# Patient Record
Sex: MALE | Race: WHITE | NOT HISPANIC OR LATINO | Employment: OTHER | URBAN - METROPOLITAN AREA
[De-identification: names, ages, dates, MRNs, and addresses within clinical notes are randomized per-mention and may not be internally consistent; named-entity substitution may affect disease eponyms.]

---

## 2017-10-03 ENCOUNTER — OFFICE VISIT (OUTPATIENT)
Dept: FAMILY MEDICINE | Facility: CLINIC | Age: 37
End: 2017-10-03

## 2017-10-03 VITALS
HEIGHT: 76 IN | OXYGEN SATURATION: 96 % | TEMPERATURE: 98.6 F | HEART RATE: 113 BPM | BODY MASS INDEX: 38.36 KG/M2 | WEIGHT: 315 LBS | SYSTOLIC BLOOD PRESSURE: 184 MMHG | DIASTOLIC BLOOD PRESSURE: 116 MMHG

## 2017-10-03 DIAGNOSIS — K04.7 DENTAL ABSCESS: Primary | ICD-10-CM

## 2017-10-03 DIAGNOSIS — E78.5 HYPERLIPIDEMIA WITH TARGET LDL LESS THAN 70: ICD-10-CM

## 2017-10-03 DIAGNOSIS — E66.01 MORBID OBESITY (H): ICD-10-CM

## 2017-10-03 DIAGNOSIS — I10 HYPERTENSION GOAL BP (BLOOD PRESSURE) < 140/90: ICD-10-CM

## 2017-10-03 DIAGNOSIS — G47.33 OSA (OBSTRUCTIVE SLEEP APNEA): ICD-10-CM

## 2017-10-03 LAB
ERYTHROCYTE [DISTWIDTH] IN BLOOD BY AUTOMATED COUNT: 12.1 % (ref 10–15)
HBA1C MFR BLD: 11.5 % (ref 4.3–6)
HCT VFR BLD AUTO: 44.7 % (ref 40–53)
HGB BLD-MCNC: 16.2 G/DL (ref 13.3–17.7)
MCH RBC QN AUTO: 31 PG (ref 26.5–33)
MCHC RBC AUTO-ENTMCNC: 36.2 G/DL (ref 31.5–36.5)
MCV RBC AUTO: 86 FL (ref 78–100)
PLATELET # BLD AUTO: 220 10E9/L (ref 150–450)
RBC # BLD AUTO: 5.23 10E12/L (ref 4.4–5.9)
WBC # BLD AUTO: 6.2 10E9/L (ref 4–11)

## 2017-10-03 PROCEDURE — 82043 UR ALBUMIN QUANTITATIVE: CPT | Performed by: PHYSICIAN ASSISTANT

## 2017-10-03 PROCEDURE — 36415 COLL VENOUS BLD VENIPUNCTURE: CPT | Performed by: PHYSICIAN ASSISTANT

## 2017-10-03 PROCEDURE — 99214 OFFICE O/P EST MOD 30 MIN: CPT | Performed by: PHYSICIAN ASSISTANT

## 2017-10-03 PROCEDURE — 80053 COMPREHEN METABOLIC PANEL: CPT | Performed by: PHYSICIAN ASSISTANT

## 2017-10-03 PROCEDURE — 84443 ASSAY THYROID STIM HORMONE: CPT | Performed by: PHYSICIAN ASSISTANT

## 2017-10-03 PROCEDURE — 83721 ASSAY OF BLOOD LIPOPROTEIN: CPT | Performed by: PHYSICIAN ASSISTANT

## 2017-10-03 PROCEDURE — 83036 HEMOGLOBIN GLYCOSYLATED A1C: CPT | Performed by: PHYSICIAN ASSISTANT

## 2017-10-03 PROCEDURE — 85027 COMPLETE CBC AUTOMATED: CPT | Performed by: PHYSICIAN ASSISTANT

## 2017-10-03 RX ORDER — LISINOPRIL 20 MG/1
20 TABLET ORAL DAILY
Qty: 90 TABLET | Refills: 1 | Status: SHIPPED | OUTPATIENT
Start: 2017-10-03 | End: 2017-10-26

## 2017-10-03 RX ORDER — CLINDAMYCIN HCL 150 MG
150 CAPSULE ORAL 4 TIMES DAILY
Qty: 40 CAPSULE | Refills: 0 | COMMUNITY
Start: 2017-10-03 | End: 2017-10-26

## 2017-10-03 NOTE — NURSING NOTE
"Chief Complaint   Patient presents with     Hypertension     210/160 bp yesterday.        Initial BP (!) 184/116  Pulse 113  Temp 98.6  F (37  C) (Tympanic)  Ht 6' 4\" (1.93 m)  Wt (!) 340 lb (154.2 kg)  SpO2 96%  BMI 41.39 kg/m2 Estimated body mass index is 41.39 kg/(m^2) as calculated from the following:    Height as of this encounter: 6' 4\" (1.93 m).    Weight as of this encounter: 340 lb (154.2 kg).  Medication Reconciliation: complete   Theresa Jackson, JAMILA      "

## 2017-10-03 NOTE — PATIENT INSTRUCTIONS
You may be eligible for the GRADE study at the [facility]  (Glycemia reduction approaches in Diabetes: A comparative effectiveness study).    The point of the study is to determine what is the best second drug to add to metformin in patients with type 2 diabetes.  Adullts with type 2 for less than 10 years who take metformin or no medication at all.  Study participants would come to the [place] 4 times a year for seven years and receive FREE medications, lab tests, diabetes supplies.    Call , email [email or web address], or visit BRYNN on faceboook at [email or web address]    [facility] Hypertension Study Summary     Thank you for your interest in the [facility] hypertension research study. If you would like to enroll or know more about using your genetics to determine which hypertensive medications may work best for you please contact the research coordinator as [phone] or email [email or web address]    If enrolled you would be asked to attend 5 to 8 study visits over the next 12 months.    The  may reach out to you to ask some questions about your medical history and availability for future visits.

## 2017-10-03 NOTE — MR AVS SNAPSHOT
After Visit Summary   10/3/2017    John Dailey    MRN: [id number]           Patient Information     Date Of Birth          1980        Visit Information        Provider Department      10/3/2017 1:20 PM Mee Geller PA-C [facility] [place]        Today's Diagnoses     Dental abscess    -  1    Uncontrolled type 2 diabetes mellitus with complication, without long-term current use of insulin (H)        Morbid obesity (H)        Hypertension goal BP (blood pressure) < 140/90        Hyperlipidemia with target LDL less than 70        YULIA (obstructive sleep apnea)          Care Instructions    You may be eligible for the GRADE study at the [facility]  (Glycemia reduction approaches in Diabetes: A comparative effectiveness study).    The point of the study is to determine what is the best second drug to add to metformin in patients with type 2 diabetes.  Adullts with type 2 for less than 10 years who take metformin or no medication at all.  Study participants would come to the [place] 4 times a year for seven years and receive FREE medications, lab tests, diabetes supplies.    Call , email [email or web address], or visit SIOBHAN on faceboook at [email or web address]    [facility] Hypertension Study Summary     Thank you for your interest in the [facility] hypertension research study. If you would like to enroll or know more about using your genetics to determine which hypertensive medications may work best for you please contact the research coordinator as [phone] or email [email or web address]    If enrolled you would be asked to attend 5 to 8 study visits over the next 12 months.    The  may reach out to you to ask some questions about your medical history and availability for future visits.               Follow-ups after your visit        Who to contact     If you have questions or need follow up information about today's clinic visit  "or your schedule please contact [facility] directly at [phone].  Normal or non-critical lab and imaging results will be communicated to you by MyChart, letter or phone within 4 business days after the clinic has received the results. If you do not hear from us within 7 days, please contact the clinic through [employer] or phone. If you have a critical or abnormal lab result, we will notify you by phone as soon as possible.  Submit refill requests through [employer] or call your pharmacy and they will forward the refill request to us. Please allow 3 business days for your refill to be completed.          Additional Information About Your Visit        UluleharCouple Information     [employer] lets you send messages to your doctor, view your test results, renew your prescriptions, schedule appointments and more. To sign up, go to [email or web address] . Click on \"Log in\" on the left side of the screen, which will take you to the Welcome page. Then click on \"Sign up Now\" on the right side of the page.     You will be asked to enter the access code listed below, as well as some personal information. Please follow the directions to create your username and password.     Your access code is: [email or web address]  Expires: 2018  1:58 PM     Your access code will  in 90 days. If you need help or a new code, please call your [place] clinic or [phone].        Care EveryWhere ID     This is your Care EveryWhere ID. This could be used by other organizations to access your [place] medical records  [id number]        Your Vitals Were     Pulse Temperature Height Pulse Oximetry BMI (Body Mass Index)       113 98.6  F (37  C) (Tympanic) 6' 4\" (1.93 m) 96% 41.39 kg/m2        Blood Pressure from Last 3 Encounters:   10/03/17 (!) 184/116   16 (!) 142/100   05/14/15 (!) 151/97    Weight from Last 3 Encounters:   10/03/17 (!) 340 lb (154.2 kg)   16 (!) 342 lb (155.1 kg)   05/14/15 (!) 389 lb (176.4 kg)    "           We Performed the Following     Albumin Random Urine Quantitative with Creat Ratio     CBC with platelets     Comprehensive metabolic panel     Hemoglobin A1c     LDL cholesterol direct     TSH with free T4 reflex          Today's Medication Changes          These changes are accurate as of: 10/3/17  1:58 PM.  If you have any questions, ask your nurse or doctor.               Start taking these medicines.        Dose/Directions    lisinopril 20 MG tablet   Commonly known as:  PRINIVIL/ZESTRIL   Used for:  Hypertension goal BP (blood pressure) < 140/90   Started by:  Mee Geller PA-C        Dose:  20 mg   Take 1 tablet (20 mg) by mouth daily   Quantity:  90 tablet   Refills:  1       metFORMIN 500 MG tablet   Commonly known as:  GLUCOPHAGE   Used for:  Uncontrolled type 2 diabetes mellitus with complication, without long-term current use of insulin (H)   Started by:  Mee Geller PA-C        Dose:  500 mg   Take 1 tablet (500 mg) by mouth daily (with dinner) X 3 days.  Then increase to 1 tablet BID with meals x 3 days, then 2 tabs with breakfast & 1 tab with dinner x 3 days, then 2 tabs BID with meals thereafter.   Quantity:  180 tablet   Refills:  1            Where to get your medicines      These medications were sent to [facility] [address] [place] [address] AT [facility] [address] [address]    Hours:  24-hours Phone:  [phone]     lisinopril 20 MG tablet         Some of these will need a paper prescription and others can be bought over the counter.  Ask your nurse if you have questions.     Bring a paper prescription for each of these medications     metFORMIN 500 MG tablet                Primary Care Provider Office Phone # Fax #    Babar Oliveira [phone]       [address] [place] [id number]        Equal Access to Services     KERRI ESTRADA AH: keven Olivera  erik adenlyric hummelevelia hareshandrea aguilar. So [facility] [phone].    ATENCIÓN: Si margie lau, tiene a wilkins disposición servicios gratuitos de asistencia lingüística. Karissa al [phone].    We comply with applicable federal civil rights laws and [place] laws. We do not discriminate on the basis of race, color, national origin, age, disability, sex, sexual orientation, or gender identity.            Thank you!     Thank you for choosing [facility]  for your care. Our goal is always to provide you with excellent care. Hearing back from our patients is one way we can continue to improve our services. Please take a few minutes to complete the written survey that you may receive in the mail after your visit with us. Thank you!             Your Updated Medication List - Protect others around you: Learn how to safely use, store and throw away your medicines at www.disposemymeds.org.          This list is accurate as of: 10/3/17  1:58 PM.  Always use your most recent med list.                   Brand Name Dispense Instructions for use Diagnosis    AUGMENTIN PO           lisinopril 20 MG tablet    PRINIVIL/ZESTRIL    90 tablet    Take 1 tablet (20 mg) by mouth daily    Hypertension goal BP (blood pressure) < 140/90       metFORMIN 500 MG tablet    GLUCOPHAGE    180 tablet    Take 1 tablet (500 mg) by mouth daily (with dinner) X 3 days.  Then increase to 1 tablet BID with meals x 3 days, then 2 tabs with breakfast & 1 tab with dinner x 3 days, then 2 tabs BID with meals thereafter.    Uncontrolled type 2 diabetes mellitus with complication, without long-term current use of insulin (H)

## 2017-10-03 NOTE — PROGRESS NOTES
SUBJECTIVE:   John Dailey is a 37 year old male who presents to clinic today for the following health issues:    Elevated blood pressure  Blood pressure has been chronically elevated over the last few years. Yesterday his blood pressure was 210/160 at the [facility]. He is unable to have dental procedure done until his blood pressure is controlled. Developed tooth pain last week with associated ear pain. Currently on Clindamycin. Takes ibuprofen and tylenol for pain relief - 800 mg ibuprofen 3-4 times per day, and 800 mg tylenol 3-4 times per day. He reports dizziness with severe tooth pain. Denies fever.     BP Readings from Last 6 Encounters:   10/03/17 (!) 184/116   03/25/16 (!) 142/100   05/14/15 (!) 151/97   04/23/15 (!) 150/100   04/03/15 (!) 138/102   03/25/15 (!) 140/106     Diabetes  Diagnosed with diabetes in 2015. Previously was prescribed Metformin by Dr Oliveira. Denies side effects with Metformin. Patient relates he has been losing weight over the last two years. However, he notes he has gained 40 pounds since the last time he weighed himself. He was surprised by this when he stepped on the scale today as he was down to 300 pounds. He eats small frequent meals. Has cut down on juices and sodas. Denies numbness or tingling in extremities. No vision changes.    Lab Results   Component Value Date    A1C 11.5 10/03/2017    A1C 11.5 03/18/2015     Wt Readings from Last 3 Encounters:   10/03/17 (!) 340 lb (154.2 kg)   03/25/16 (!) 342 lb (155.1 kg)   05/14/15 (!) 389 lb (176.4 kg)       Social history:   FMHx: Hypertension in father, Diabetes in mother     Problem list and histories reviewed & adjusted, as indicated.  Additional history: as documented    Patient Active Problem List   Diagnosis     TBI (traumatic brain injury) (H)     Hypertension goal BP (blood pressure) < 140/90     Nephrolithiasis     YULIA (obstructive sleep apnea)     Hyperlipidemia with target LDL less than 70      Morbid obesity (H)     Type 2 diabetes mellitus without complication (H)     Uncontrolled type 2 diabetes mellitus with complication, without long-term current use of insulin (H)     Past Surgical History:   Procedure Laterality Date     DENTAL SURGERY  1997    wisdom teeth     SURGICAL HISTORY OF -   2002, 2003    kidney stone removal       Social History   Substance Use Topics     Smoking status: Never Smoker     Smokeless tobacco: Never Used     Alcohol use 0.0 - 0.5 oz/week     0 - 1 Standard drinks or equivalent per week      Comment: 1 or 2 drinks yearly     Family History   Problem Relation Age of Onset     DIABETES Mother      prediabetes     Hypertension Father      C.A.D. Father      MI age 58     CEREBROVASCULAR DISEASE Father      DIABETES Brother 35     Insulin dependent     Glaucoma Maternal Grandmother      C.A.D. Maternal Grandfather      Respiratory Brother      YULIA         Current Outpatient Prescriptions   Medication Sig Dispense Refill     Amoxicillin-Pot Clavulanate (AUGMENTIN PO)        metFORMIN (GLUCOPHAGE) 500 MG tablet Take 1 tablet (500 mg) by mouth daily (with dinner) X 3 days.  Then increase to 1 tablet BID with meals x 3 days, then 2 tabs with breakfast & 1 tab with dinner x 3 days, then 2 tabs BID with meals thereafter. 180 tablet 1     lisinopril (PRINIVIL/ZESTRIL) 20 MG tablet Take 1 tablet (20 mg) by mouth daily 90 tablet 1     clindamycin (CLEOCIN) 150 MG capsule Take 1 capsule (150 mg) by mouth 4 times daily 40 capsule 0     Allergies   Allergen Reactions     Augmentin [Amoxicillin-Pot Clavulanate] Anaphylaxis     ?     Alcohol Other (See Comments)     Patient is allergic to Beer, His tongue swells     Beer      Latex          Reviewed and updated as needed this visit by clinical staff  Tobacco  Allergies  Meds  Problems  Med Hx  Surg Hx  Fam Hx  Soc Hx        Reviewed and updated as needed this visit by Provider  Tobacco  Allergies  Meds  Problems  Med Hx  Surg Hx   "Fam Hx  Soc Hx          ROS:  Constitutional, HEENT, cardiovascular, pulmonary, GI, , musculoskeletal, neuro, skin, endocrine and psych systems are negative, except as otherwise noted.    This document serves as a record of the services and decisions personally performed and made by Mee Geller PA-C. It was created on her behalf by Kady Brown, a trained medical scribe. The creation of this document is based on the provider's statements to the medical scribe.  Kady Brown 1:44 PM October 3, 2017    OBJECTIVE:   BP (!) 184/116  Pulse 113  Temp 98.6  F (37  C) (Tympanic)  Ht 6' 4\" (1.93 m)  Wt (!) 340 lb (154.2 kg)  SpO2 96%  BMI 41.39 kg/m2  Body mass index is 41.39 kg/(m^2).  GENERAL: healthy, alert and no distress  NECK: no adenopathy, no asymmetry, masses, or scars and thyroid normal to palpation  RESP: lungs clear to auscultation - no rales, rhonchi or wheezes  CV: regular rate and rhythm, normal S1 S2, no S3 or S4, no murmur, click or rub, no peripheral edema and peripheral pulses strong  PSYCH: mentation appears normal, affect normal/bright    Diagnostic Test Results:  Results for orders placed or performed in visit on 10/03/17 (from the past 24 hour(s))   Hemoglobin A1c   Result Value Ref Range    Hemoglobin A1C 11.5 (H) 4.3 - 6.0 %   CBC with platelets   Result Value Ref Range    WBC 6.2 4.0 - 11.0 10e9/L    RBC Count 5.23 4.4 - 5.9 10e12/L    Hemoglobin 16.2 13.3 - 17.7 g/dL    Hematocrit 44.7 40.0 - 53.0 %    MCV 86 78 - 100 fl    MCH 31.0 26.5 - 33.0 pg    MCHC 36.2 31.5 - 36.5 g/dL    RDW 12.1 10.0 - 15.0 %    Platelet Count 220 150 - 450 10e9/L       ASSESSMENT/PLAN:   John was seen today for hypertension.    Diagnoses and all orders for this visit:    Dental abscess  He was scheduled to undergo dental procedure yesterday. However, it was not due to uncontrolled hypertension. He is currently taking Clindamycin.     Uncontrolled type 2 diabetes mellitus with complication, " without long-term current use of insulin (H), Morbid obesity (H)  A1c is 11.5 today. Discussed with patient the risks of uncontrolled diabetes. He previously was started on Metformin by Dr Oliveira. He relates he has been working on weight loss with eating small frequent meals. Body mass index is 41.39 kg/(m^2). Starting Metformin. Medication direction, dosage, and side effects discussed with patient. Provided patient with information on GRADE study.   -     Hemoglobin A1c  -     TSH with free T4 reflex  -     CBC with platelets  -     LDL cholesterol direct  -     metFORMIN (GLUCOPHAGE) 500 MG tablet; Take 1 tablet (500 mg) by mouth daily (with dinner) X 3 days.  Then increase to 1 tablet BID with meals x 3 days, then 2 tabs with breakfast & 1 tab with dinner x 3 days, then 2 tabs BID with meals thereafter.      Hypertension goal BP (blood pressure) < 140/90  Blood pressure is uncontrolled. It has been chronically elevated over the last several years. Yesterday he was unable to undergo dental procedure because his blood pressure was 210/160. Starting lisinopril. Medication direction, dosage, and side effects discussed with patient. Checking labs. Discussed PGen study with patient, however, he will not qualify as his BMI is too high.  Follow up in 3-5 days for blood pressure check.   -     Albumin Random Urine Quantitative with Creat Ratio  -     Comprehensive metabolic panel  -     lisinopril (PRINIVIL/ZESTRIL) 20 MG tablet; Take 1 tablet (20 mg) by mouth daily    Hyperlipidemia with target LDL less than 70  Rechecking labs today.    YULIA (obstructive sleep apnea)  - Continue CPAP    Patient Instructions   You may be eligible for the GRADE study at the [facility]  (Glycemia reduction approaches in Diabetes: A comparative effectiveness study).    The point of the study is to determine what is the best second drug to add to metformin in patients with type 2 diabetes.  Adullts with type 2 for less than 10  years who take metformin or no medication at all.  Study participants would come to the [place] 4 times a year for seven years and receive FREE medications, lab tests, diabetes supplies.    Call , email [email or web address], or visit BRYNN on faceboook at [email or web address]    [facility] Hypertension Study Summary     Thank you for your interest in the [facility] hypertension research study. If you would like to enroll or know more about using your genetics to determine which hypertensive medications may work best for you please contact the research coordinator as [phone] or email [email or web address]    If enrolled you would be asked to attend 5 to 8 study visits over the next 12 months.    The  may reach out to you to ask some questions about your medical history and availability for future visits.           The information in this document, created by the medical scribe for me, accurately reflects the services I personally performed and the decisions made by me. I have reviewed and approved this document for accuracy prior to leaving the patient care area.  October 3, 2017 1:44 PM      Mee Geller PA-C  [facility] [place]

## 2017-10-04 LAB
ALBUMIN SERPL-MCNC: 4.2 G/DL (ref 3.4–5)
ALP SERPL-CCNC: 101 U/L (ref 40–150)
ALT SERPL W P-5'-P-CCNC: 32 U/L (ref 0–70)
ANION GAP SERPL CALCULATED.3IONS-SCNC: 8 MMOL/L (ref 3–14)
AST SERPL W P-5'-P-CCNC: 18 U/L (ref 0–45)
BILIRUB SERPL-MCNC: 0.8 MG/DL (ref 0.2–1.3)
BUN SERPL-MCNC: 15 MG/DL (ref 7–30)
CALCIUM SERPL-MCNC: 9.3 MG/DL (ref 8.5–10.1)
CHLORIDE SERPL-SCNC: 101 MMOL/L (ref 94–109)
CO2 SERPL-SCNC: 28 MMOL/L (ref 20–32)
CREAT SERPL-MCNC: 0.73 MG/DL (ref 0.66–1.25)
CREAT UR-MCNC: 48 MG/DL
GFR SERPL CREATININE-BSD FRML MDRD: >90 ML/MIN/1.7M2
GLUCOSE SERPL-MCNC: 281 MG/DL (ref 70–99)
LDLC SERPL DIRECT ASSAY-MCNC: 84 MG/DL
MICROALBUMIN UR-MCNC: 63 MG/L
MICROALBUMIN/CREAT UR: 129.07 MG/G CR (ref 0–17)
POTASSIUM SERPL-SCNC: 4 MMOL/L (ref 3.4–5.3)
PROT SERPL-MCNC: 7.6 G/DL (ref 6.8–8.8)
SODIUM SERPL-SCNC: 137 MMOL/L (ref 133–144)
TSH SERPL DL<=0.005 MIU/L-ACNC: 0.6 MU/L (ref 0.4–4)

## 2017-10-04 NOTE — PROGRESS NOTES
John  I have reviewed your recent labs. Here are the results:    -Liver and gallbladder tests are normal. (ALT,AST, Alk phos, bilirubin), kidney function is normal (Cr, GFR), Sodium is normal, Potassium is normal, Calcium is normal.  -Cholesterol levels (LDL,HDL, Triglycerides) are normal.  ADVISE: rechecking in 1 year.  -TSH (thyroid stimulating hormone) level is normal which indicates normal thyroid function.  -Normal red blood cell (hgb) levels, normal white blood cell count and normal platelet levels.  -A1C test (average blood sugar the last 2-3 months) is above your goal.   ADVISE:  Start metformin as we discussed.  Diabetic followup appointment in 4 weeks or follow up with the GRADE study as we discussed.  -Microalbumin is present in your urine, this is due to your blood pressure and your uncontrolled diabetes and indicative of early damage to your kidneys.     If you have any questions please do not hesitate to contact our office via phone ([phone]) or MyChart.    Mee Geller, MS, PA-C  [facility]

## 2017-10-05 ENCOUNTER — ALLIED HEALTH/NURSE VISIT (OUTPATIENT)
Dept: FAMILY MEDICINE | Facility: CLINIC | Age: 37
End: 2017-10-05

## 2017-10-05 VITALS — SYSTOLIC BLOOD PRESSURE: 158 MMHG | DIASTOLIC BLOOD PRESSURE: 100 MMHG

## 2017-10-05 DIAGNOSIS — I10 HYPERTENSION GOAL BP (BLOOD PRESSURE) < 140/90: Primary | ICD-10-CM

## 2017-10-05 PROCEDURE — 99207 ZZC NO CHARGE NURSE ONLY: CPT | Performed by: FAMILY MEDICINE

## 2017-10-05 NOTE — PROGRESS NOTES
John Dailye is enrolled/participating in the retail pharmacy Blood Pressure Goals Achievement Program (BPGAP).  John Dailey was evaluated at [facility] on October 5, 2017 at which time his blood pressure was:    BP Readings from Last 3 Encounters:   10/05/17 (!) 160/102   10/03/17 (!) 184/116   03/25/16 (!) 142/100     Reviewed lifestyle modifications for blood pressure control and reduction: including making healthy food choices, managing weight, getting regular exercise, smoking cessation, reducing alcohol consumption, monitoring blood pressure regularly.     John Dailey is not experiencing symptoms.    Follow-Up: BP is not at goal of < 140/90mmHg (patient 18+ years of age with or without diabetes), Recommended follow-up with PCP.  Routing to PCP for further review.    Recommendation to Provider: Patient qualified for PGEN study.  Provided information re study.      John Dailey was evaluated for enrollment into the PGEN study today.    Patient eligible for enrollment:  Yes  Patient interested in enrollment:  Yes    Completed by: Thank you,  Jayla Molina [facility], Mgr [facility] [place] [phone]

## 2017-10-05 NOTE — MR AVS SNAPSHOT
After Visit Summary   10/5/2017    John Dailey    MRN: [id number]           Patient Information     Date Of Birth          1980        Visit Information        Provider Department      10/5/2017 12:25 PM Babar Oliveira MD [facility]        Today's Diagnoses     Hypertension goal BP (blood pressure) < 140/90    -  1       Follow-ups after your visit        Who to contact     If you have questions or need follow up information about today's clinic visit or your schedule please contact [facility] directly at [phone].  Normal or non-critical lab and imaging results will be communicated to you by Wanderflyhart, letter or phone within 4 business days after the clinic has received the results. If you do not hear from us within 7 days, please contact the clinic through Galvanize Venturest or phone. If you have a critical or abnormal lab result, we will notify you by phone as soon as possible.  Submit refill requests through [employer]X Control Solutions or call your pharmacy and they will forward the refill request to us. Please allow 3 business days for your refill to be completed.          Additional Information About Your Visit        MyChart Information     [employer] gives you secure access to your electronic health record. If you see a primary care provider, you can also send messages to your care team and make appointments. If you have questions, please call your primary care clinic.  If you do not have a primary care provider, please call [phone] and they will assist you.        Care EveryWhere ID     This is your Care EveryWhere ID. This could be used by other organizations to access your [facility] medical records  [id number]         Blood Pressure from Last 3 Encounters:   10/05/17 (!) 158/100   10/03/17 (!) 184/116   03/25/16 (!) 142/100    Weight from Last 3 Encounters:   10/03/17 (!) 340 lb (154.2 kg)   03/25/16 (!) 342 lb (155.1 kg)   05/14/15 (!) 389 lb (176.4 kg)              Today,  you had the following     No orders found for display       Primary Care Provider Office Phone # Fax #    Babar Oliveira [phone]       [address] [place] [address]        Equal Access to Services     KERRI ESTRADA : Hadii yessica ku neilo Soluis, waaxda luqadaha, qaybta kaalmada adeegyada, andrea basurton atif carter se cruz. So [facility] [phone].    ATENCIÓN: Si habla español, tiene a wilkins disposición servicios gratuitos de asistencia lingüística. Llame al [phone].    We comply with applicable federal civil rights laws and [place] laws. We do not discriminate on the basis of race, color, national origin, age, disability, sex, sexual orientation, or gender identity.            Thank you!     Thank you for choosing [facility]  for your care. Our goal is always to provide you with excellent care. Hearing back from our patients is one way we can continue to improve our services. Please take a few minutes to complete the written survey that you may receive in the mail after your visit with us. Thank you!             Your Updated Medication List - Protect others around you: Learn how to safely use, store and throw away your medicines at www.disposemymeds.org.          This list is accurate as of: 10/5/17 11:59 PM.  Always use your most recent med list.                   Brand Name Dispense Instructions for use Diagnosis    clindamycin 150 MG capsule    CLEOCIN    40 capsule    Take 1 capsule (150 mg) by mouth 4 times daily        lisinopril 20 MG tablet    PRINIVIL/ZESTRIL    90 tablet    Take 1 tablet (20 mg) by mouth daily    Hypertension goal BP (blood pressure) < 140/90       metFORMIN 500 MG tablet    GLUCOPHAGE    180 tablet    Take 1 tablet (500 mg) by mouth daily (with dinner) X 3 days.  Then increase to 1 tablet BID with meals x 3 days, then 2 tabs with breakfast & 1 tab with dinner x 3 days, then 2 tabs BID with meals thereafter.    Uncontrolled type 2  diabetes mellitus with complication, without long-term current use of insulin (H)

## 2017-10-09 ENCOUNTER — TELEPHONE (OUTPATIENT)
Dept: FAMILY MEDICINE | Facility: CLINIC | Age: 37
End: 2017-10-09

## 2017-10-09 NOTE — LETTER
[facility]  [address] [place] [address]                  [phone]   October 11, 2017    John Dailey  [address] [address]      Dear John,    My staff have been attempting to reach you in regards to your recent blood pressures they have been elevated and I would like to have you be seen in clinic.   Please contact my office to make this appointment       In addition, here is a list of due or overdue Health Maintenance reminders.    Health Maintenance Due   Topic Date Due     Eye Exam - yearly  03/12/1982     Pneumovax Vaccine  03/12/1983     Diabetic Foot Exam - yearly  03/25/2016     Flu Vaccine - yearly  09/01/2017       Please call us at [phone] (or use Litehouse) to address the above recommendations.            Thank you very much for trusting [facility]..     Healthy regards,        Babar Oliveira M.D.

## 2017-10-09 NOTE — TELEPHONE ENCOUNTER
Attempt #1  Called   Telephone Information:   Mobile [phone]     Left a non-detailed message to call back and speak with any triage nurse.    Oumou Brock RN  [place] [facility]

## 2017-10-09 NOTE — TELEPHONE ENCOUNTER
Jayla Molina [facility] at 10/5/2017 12:25 PM        Status: Sign at close encounter            John Dailey is enrolled/participating in the retail pharmacy Blood Pressure Goals Achievement Program (BPGAP).  John Dailey was evaluated at [facility] on October 5, 2017 at which time his blood pressure was:         BP Readings from Last 3 Encounters:   10/05/17 (!) 160/102   10/03/17 (!) 184/116   03/25/16 (!) 142/100      Reviewed lifestyle modifications for blood pressure control and reduction: including making healthy food choices, managing weight, getting regular exercise, smoking cessation, reducing alcohol consumption, monitoring blood pressure regularly.      John Dailey is not experiencing symptoms.     Follow-Up: BP is not at goal of < 140/90mmHg (patient 18+ years of age with or without diabetes), Recommended follow-up with PCP.  Routing to PCP for further review.     Recommendation to Provider: Patient qualified for PGEN study.  Provided information re study.       John Dailey was evaluated for enrollment into the PGEN study today.     Patient eligible for enrollment:  Yes  Patient interested in enrollment:  Yes     Completed by: Thank you,  Jayla Molina [facility], Mgr [facility] [phone]                           Babar Oliveira MD at 10/5/2017 12:25 PM        Status: Signed            Needs follow within next few days             Called # [phone] (home)       Left a non detailed Vm     Gail Costa RN, BSN  [facility]

## 2017-10-10 NOTE — TELEPHONE ENCOUNTER
Attempt # 2     Called # [phone] (home)     Left a non detailed VM     Gail Costa RN, BSN  [place] [facility]

## 2017-10-11 NOTE — TELEPHONE ENCOUNTER
Called # [phone] (home)     Left a non detailed VM     Letter sent     Gail Costa RN, BSN  [place] [facility]

## 2017-10-13 ENCOUNTER — ALLIED HEALTH/NURSE VISIT (OUTPATIENT)
Dept: FAMILY MEDICINE | Facility: CLINIC | Age: 37
End: 2017-10-13

## 2017-10-13 VITALS — DIASTOLIC BLOOD PRESSURE: 101 MMHG | SYSTOLIC BLOOD PRESSURE: 154 MMHG

## 2017-10-13 DIAGNOSIS — I10 HYPERTENSION GOAL BP (BLOOD PRESSURE) < 140/90: Primary | ICD-10-CM

## 2017-10-13 PROCEDURE — 99207 ZZC NO CHARGE NURSE ONLY: CPT | Performed by: FAMILY MEDICINE

## 2017-10-13 NOTE — PROGRESS NOTES
John Dailey is enrolled/participating in the retail pharmacy Blood Pressure Goals Achievement Program (BPGAP).  John Dailey was evaluated at [facility] on October 13, 2017 at which time his blood pressure was:    BP Readings from Last 3 Encounters:   10/13/17 (!) 154/101   10/05/17 (!) 158/100   10/03/17 (!) 184/116     Reviewed lifestyle modifications for blood pressure control and reduction: including making healthy food choices, managing weight, getting regular exercise, smoking cessation, reducing alcohol consumption, monitoring blood pressure regularly.     John Dailey is not experiencing symptoms.    Follow-Up: BP is not at goal of < 140/90mmHg (patient 18+ years of age with or without diabetes), Recommended follow-up with PCP.  Routing to PCP for further review.    Recommendation to Provider: Pt is in process of enrolling in PGEN study.  Follow up with Pharmacy as recommended by .    John Dailey was evaluated for enrollment into the PGEN study today.    Patient eligible for enrollment:  Yes  Patient interested in enrollment:  Yes    Completed by: Thank you,  Jayla Molina [facility], Mgr [facility] [phone]    NOTE: pt was in contact today with PGEN .  Appt scheduled for 10-27 at 1:30 pm

## 2017-10-13 NOTE — LETTER
[facility]  [address] [place] [address]                                                                                                       [phone]    October 16, 2017    Jhon Dailey  [address] [facility] [address]      To Whom it May Concern:    The above patient should be allowed to proceed with his dental procedure - we are continually working on his hypertension control.  Please contact me with questions or concerns.      Sincerely,    Mee Geller PA-C

## 2017-10-13 NOTE — MR AVS SNAPSHOT
After Visit Summary   10/13/2017    John Dailey    MRN: [id number]           Patient Information     Date Of Birth          1980        Visit Information        Provider Department      10/13/2017 3:27 PM Babar Oliveira MD [facility]        Today's Diagnoses     Hypertension goal BP (blood pressure) < 140/90    -  1       Follow-ups after your visit        Who to contact     If you have questions or need follow up information about today's clinic visit or your schedule please contact [facility] directly at [phone].  Normal or non-critical lab and imaging results will be communicated to you by Chalkablehart, letter or phone within 4 business days after the clinic has received the results. If you do not hear from us within 7 days, please contact the clinic through Trueffectt or phone. If you have a critical or abnormal lab result, we will notify you by phone as soon as possible.  Submit refill requests through Mirada or call your pharmacy and they will forward the refill request to us. Please allow 3 business days for your refill to be completed.          Additional Information About Your Visit        MyChart Information     Mirada gives you secure access to your electronic health record. If you see a primary care provider, you can also send messages to your care team and make appointments. If you have questions, please call your primary care clinic.  If you do not have a primary care provider, please call [phone] and they will assist you.        Care EveryWhere ID     This is your Care EveryWhere ID. This could be used by other organizations to access your [facility] medical records  [id number]         Blood Pressure from Last 3 Encounters:   10/13/17 (!) 154/101   10/05/17 (!) 158/100   10/03/17 (!) 184/116    Weight from Last 3 Encounters:   10/03/17 (!) 340 lb (154.2 kg)   03/25/16 (!) 342 lb (155.1 kg)   05/14/15 (!) 389 lb (176.4 kg)              Today,  you had the following     No orders found for display       Primary Care Provider Office Phone # Fax #    Babar Oliveira [phone]       [address] [place] [address]        Equal Access to Services     KERRI ESTRADA : Hadii yessica ku neilo Soluis, waaxda luqadaha, qaybta kaalmada adeegyada, andrea basurton atif carter se cruz. So [facility] [phone].    ATENCIÓN: Si habla español, tiene a wilkins disposición servicios gratuitos de asistencia lingüística. Llame al [phone].    We comply with applicable federal civil rights laws and [place] laws. We do not discriminate on the basis of race, color, national origin, age, disability, sex, sexual orientation, or gender identity.            Thank you!     Thank you for choosing [facility]  for your care. Our goal is always to provide you with excellent care. Hearing back from our patients is one way we can continue to improve our services. Please take a few minutes to complete the written survey that you may receive in the mail after your visit with us. Thank you!             Your Updated Medication List - Protect others around you: Learn how to safely use, store and throw away your medicines at www.disposemymeds.org.          This list is accurate as of: 10/13/17  3:29 PM.  Always use your most recent med list.                   Brand Name Dispense Instructions for use Diagnosis    clindamycin 150 MG capsule    CLEOCIN    40 capsule    Take 1 capsule (150 mg) by mouth 4 times daily        lisinopril 20 MG tablet    PRINIVIL/ZESTRIL    90 tablet    Take 1 tablet (20 mg) by mouth daily    Hypertension goal BP (blood pressure) < 140/90       metFORMIN 500 MG tablet    GLUCOPHAGE    180 tablet    Take 1 tablet (500 mg) by mouth daily (with dinner) X 3 days.  Then increase to 1 tablet BID with meals x 3 days, then 2 tabs with breakfast & 1 tab with dinner x 3 days, then 2 tabs BID with meals thereafter.    Uncontrolled type 2  diabetes mellitus with complication, without long-term current use of insulin (H)

## 2017-10-16 NOTE — PROGRESS NOTES
Pt walked into the clinic today and was advised to keep their PGEN study appt.  Pt also advised to follow up for BP check.      The patient indicates understanding of these issues and agrees with the plan.  Donna Capellan RN  [facility]

## 2017-10-16 NOTE — PROGRESS NOTES
Patient has PGEN f/u appt scheduled.  Will defer any additional medication to the study coordinators.

## 2017-10-16 NOTE — PROGRESS NOTES
Nyla Capellan contacted John on 10/16/17 and left a message. If patient calls back please schedule appointment as soon as possible.  Donna Capellan RN  [facility]

## 2017-10-26 ENCOUNTER — TELEPHONE (OUTPATIENT)
Dept: FAMILY MEDICINE | Facility: CLINIC | Age: 37
End: 2017-10-26

## 2017-10-26 ENCOUNTER — OFFICE VISIT (OUTPATIENT)
Dept: FAMILY MEDICINE | Facility: CLINIC | Age: 37
End: 2017-10-26

## 2017-10-26 VITALS
HEIGHT: 76 IN | SYSTOLIC BLOOD PRESSURE: 142 MMHG | RESPIRATION RATE: 18 BRPM | TEMPERATURE: 97.8 F | HEART RATE: 104 BPM | BODY MASS INDEX: 38.36 KG/M2 | OXYGEN SATURATION: 97 % | DIASTOLIC BLOOD PRESSURE: 93 MMHG | WEIGHT: 315 LBS

## 2017-10-26 DIAGNOSIS — Z13.89 SCREENING FOR DIABETIC PERIPHERAL NEUROPATHY: ICD-10-CM

## 2017-10-26 DIAGNOSIS — E66.01 MORBID OBESITY (H): Primary | ICD-10-CM

## 2017-10-26 DIAGNOSIS — E11.9 TYPE 2 DIABETES MELLITUS WITHOUT COMPLICATION, UNSPECIFIED LONG TERM INSULIN USE STATUS: ICD-10-CM

## 2017-10-26 DIAGNOSIS — I10 HYPERTENSION GOAL BP (BLOOD PRESSURE) < 140/90: ICD-10-CM

## 2017-10-26 PROCEDURE — 99214 OFFICE O/P EST MOD 30 MIN: CPT | Performed by: FAMILY MEDICINE

## 2017-10-26 RX ORDER — LISINOPRIL 20 MG/1
30 TABLET ORAL DAILY
Qty: 90 TABLET | Refills: 1 | COMMUNITY
Start: 2017-10-26 | End: 2020-07-28

## 2017-10-26 NOTE — TELEPHONE ENCOUNTER
Pt has tooth problems and has a lot of pain with a tooth that needs to be extracted.    BP today is 170/117 approx 15 min ago.  Pt is only in pain.  No C/P, palpitations, vision changes, N/V, H/A.    [place] Pt will try to make an appt in EP to see a provider today, may walk over to the clinic.  Advised of available appt slots with Dr. Melendez.     Pt advised to call the clinic with any further symptoms or changes. Also advised to go to UC or ER if symptoms increase. The patient indicates understanding of these issues and agrees with the plan.    The patient indicates understanding of these issues and agrees with the plan.    Donna Capellan RN  [place] [facility]

## 2017-10-26 NOTE — LETTER
"[facility]  [address] [facility] [address]  Phone: [phone]    October 26, 2017        John Dailey  [address] [facility] [address]        To whom it may concern:    RE: John Dailey    Patient was seen and treated today at our clinic for his increased blood pressure, his vital sign is as below.    Vital Signs 10/26/2017   Systolic 142   Diastolic 93   Pulse 104   Temperature 97.8   Respirations 18   Weight (LB) 342 lb   Height 6' 4\"   BMI (Calculated) 41.72   Pain    O2 97       I suggested him to increase the dose of blood pressure medicine(Lisinopril) to 30mg daily. If his blood pressure got controlled under 142/93mmHg within next 1-2 days, he might be stable to do the scheduled dental procedure.    Please contact me for questions or concerns.        Sincerely,    Raul Melendez MD  "

## 2017-10-26 NOTE — MR AVS SNAPSHOT
After Visit Summary   10/26/2017    John Dailey    MRN: [id number]           Patient Information     Date Of Birth          1980        Visit Information        Provider Department      10/26/2017 3:20 PM Raul Melendez MD [facility]        Today's Diagnoses     Morbid obesity (H)    -  1    Screening for diabetic peripheral neuropathy        Hypertension goal BP (blood pressure) < 140/90        Type 2 diabetes mellitus without complication, unspecified long term insulin use status (H)           Follow-ups after your visit        Your next 10 appointments already scheduled     Oct 27, 2017  1:30 PM CDT   Nurse Only with Vandana Sánchez MD   [facility]    [address]  [facility] [address] [phone]              Who to contact     If you have questions or need follow up information about today's clinic visit or your schedule please contact [facility] directly at [phone].  Normal or non-critical lab and imaging results will be communicated to you by Synergis Educationhart, letter or phone within 4 business days after the clinic has received the results. If you do not hear from us within 7 days, please contact the clinic through Futuretect or phone. If you have a critical or abnormal lab result, we will notify you by phone as soon as possible.  Submit refill requests through Meetapp or call your pharmacy and they will forward the refill request to us. Please allow 3 business days for your refill to be completed.          Additional Information About Your Visit        Synergis Educationhart Information     Meetapp gives you secure access to your electronic health record. If you see a primary care provider, you can also send messages to your care team and make appointments. If you have questions, please call your primary care clinic.  If you do not have a primary care provider, please call [phone] and they will assist  "you.        Care EveryWhere ID     This is your Care EveryWhere ID. This could be used by other organizations to access your [facility] medical records  [id number]        Your Vitals Were     Pulse Temperature Respirations Height Pulse Oximetry BMI (Body Mass Index)    104 97.8  F (36.6  C) 18 6' 4\" (1.93 m) 97% 41.63 kg/m2       Blood Pressure from Last 3 Encounters:   10/26/17 (!) 142/93   10/13/17 (!) 154/101   10/05/17 (!) 158/100    Weight from Last 3 Encounters:   10/26/17 (!) 342 lb (155.1 kg)   10/03/17 (!) 340 lb (154.2 kg)   03/25/16 (!) 342 lb (155.1 kg)              We Performed the Following     FOOT EXAM  NO CHARGE [07288.114]          Today's Medication Changes          These changes are accurate as of: 10/26/17  4:08 PM.  If you have any questions, ask your nurse or doctor.               These medicines have changed or have updated prescriptions.        Dose/Directions    lisinopril 20 MG tablet   Commonly known as:  PRINIVIL/ZESTRIL   This may have changed:  how much to take   Used for:  Hypertension goal BP (blood pressure) < 140/90   Changed by:  Raul Melendez MD        Dose:  30 mg   Take 1.5 tablets (30 mg) by mouth daily   Quantity:  90 tablet   Refills:  1                Primary Care Provider Office Phone # Fax #    Babar Oliveira [phone]       [address] [place] [address]        Equal Access to Services     [facility] AH: Hadii yessica ku hadasho Soteresitaali, waaxda luqadaha, qaybta kaalmada hareshegyada, andrea cruz. So [facility] [phone].    ATENCIÓN: Si habla sid, tiene a wilkins disposición servicios gratuitos de asistencia lingüística. Llame al [phone].    We comply with applicable federal civil rights laws and [place] laws. We do not discriminate on the basis of race, color, national origin, age, disability, sex, sexual orientation, or gender identity.            Thank you!     Thank you for choosing [facility]  " for your care. Our goal is always to provide you with excellent care. Hearing back from our patients is one way we can continue to improve our services. Please take a few minutes to complete the written survey that you may receive in the mail after your visit with us. Thank you!             Your Updated Medication List - Protect others around you: Learn how to safely use, store and throw away your medicines at www.disposemymeds.org.          This list is accurate as of: 10/26/17  4:08 PM.  Always use your most recent med list.                   Brand Name Dispense Instructions for use Diagnosis    Aspirin 500 MG Tabs      Take by mouth every 4 hours        lisinopril 20 MG tablet    PRINIVIL/ZESTRIL    90 tablet    Take 1.5 tablets (30 mg) by mouth daily    Hypertension goal BP (blood pressure) < 140/90       metFORMIN 500 MG tablet    GLUCOPHAGE    180 tablet    Take 1 tablet (500 mg) by mouth daily (with dinner) X 3 days.  Then increase to 1 tablet BID with meals x 3 days, then 2 tabs with breakfast & 1 tab with dinner x 3 days, then 2 tabs BID with meals thereafter.    Uncontrolled type 2 diabetes mellitus with complication, without long-term current use of insulin (H)

## 2017-10-26 NOTE — PROGRESS NOTES
SUBJECTIVE:   John Dailey is a 37 year old male who presents to clinic today for the following health issues:      Elevated Blood Pressure        Description (location/character/radiation): Elevated /122 taken at a dentis office prior to a tooth extraction.     Accompanying signs and symptoms: tooth pain     History (similar episodes/previous evaluation): hypertension, currently on Lisinopril     Precipitating or alleviating factors: None    Therapies tried and outcome: None       Problem list and histories reviewed & adjusted, as indicated.  Additional history: as documented    Patient Active Problem List   Diagnosis     TBI (traumatic brain injury) (H)     Hypertension goal BP (blood pressure) < 140/90     Nephrolithiasis     YULIA (obstructive sleep apnea)     Hyperlipidemia with target LDL less than 70     Morbid obesity (H)     Type 2 diabetes mellitus without complication (H)     Uncontrolled type 2 diabetes mellitus with complication, without long-term current use of insulin (H)     Past Surgical History:   Procedure Laterality Date     DENTAL SURGERY  1997    wisdom teeth     SURGICAL HISTORY OF -   2002, 2003    kidney stone removal       Social History   Substance Use Topics     Smoking status: Never Smoker     Smokeless tobacco: Never Used     Alcohol use 0.0 - 0.5 oz/week     0 - 1 Standard drinks or equivalent per week      Comment: 1 or 2 drinks yearly     Family History   Problem Relation Age of Onset     DIABETES Mother      prediabetes     Hypertension Father      C.A.D. Father      MI age 58     CEREBROVASCULAR DISEASE Father      DIABETES Brother 35     Insulin dependent     Glaucoma Maternal Grandmother      C.A.D. Maternal Grandfather      Respiratory Brother      YULIA         Current Outpatient Prescriptions   Medication Sig Dispense Refill     Aspirin 500 MG TABS Take by mouth every 4 hours       lisinopril (PRINIVIL/ZESTRIL) 20 MG tablet Take 1.5 tablets (30 mg) by mouth daily 90  "tablet 1     metFORMIN (GLUCOPHAGE) 500 MG tablet Take 1 tablet (500 mg) by mouth daily (with dinner) X 3 days.  Then increase to 1 tablet BID with meals x 3 days, then 2 tabs with breakfast & 1 tab with dinner x 3 days, then 2 tabs BID with meals thereafter. 180 tablet 1     [DISCONTINUED] lisinopril (PRINIVIL/ZESTRIL) 20 MG tablet Take 1 tablet (20 mg) by mouth daily 90 tablet 1     Allergies   Allergen Reactions     Augmentin [Amoxicillin-Pot Clavulanate] Anaphylaxis     ?     Alcohol Other (See Comments)     Patient is allergic to Beer, His tongue swells     Beer      Latex      Recent Labs   Lab Test  10/03/17   1330  03/30/16   1148   03/18/15   0956   A1C  11.5*   --    --   11.5*   LDL  84   --    --   55   HDL   --    --    --   30*   TRIG   --    --    --   244*   ALT  32  47   --   58   CR  0.73  0.71   < >  0.88   GFRESTIMATED  >90  >90  Non African American GFR Calc     < >  >90  Non  GFR Calc     GFRESTBLACK  >90  >90  African American GFR Calc     < >  >90   GFR Calc     POTASSIUM  4.0  4.1   < >  4.3   TSH  0.60   --    --    --     < > = values in this interval not displayed.      BP Readings from Last 3 Encounters:   10/26/17 (!) 142/93   10/13/17 (!) 154/101   10/05/17 (!) 158/100    Wt Readings from Last 3 Encounters:   10/26/17 (!) 342 lb (155.1 kg)   10/03/17 (!) 340 lb (154.2 kg)   03/25/16 (!) 342 lb (155.1 kg)                          Reviewed and updated as needed this visit by clinical staffAllergies       Reviewed and updated as needed this visit by Provider         ROS:  Constitutional, HEENT, cardiovascular, pulmonary, gi and gu systems are negative, except as otherwise noted.      OBJECTIVE:   BP (!) 142/93 (Cuff Size: Adult Large)  Pulse 104  Temp 97.8  F (36.6  C)  Resp 18  Ht 6' 4\" (1.93 m)  Wt (!) 342 lb (155.1 kg)  SpO2 97%  BMI 41.63 kg/m2  Body mass index is 41.63 kg/(m^2).  GENERAL: healthy, alert and no distress  NECK: no adenopathy, no " asymmetry, masses, or scars and thyroid normal to palpation  RESP: lungs clear to auscultation - no rales, rhonchi or wheezes  CV: regular rate and rhythm, normal S1 S2, no S3 or S4, no murmur, click or rub, no peripheral edema and peripheral pulses strong  ABDOMEN: soft, nontender, no hepatosplenomegaly, no masses and bowel sounds normal  MS: no gross musculoskeletal defects noted, no edema        ASSESSMENT/PLAN:   ASSESSMENT / PLAN:  (E66.01) Morbid obesity (H)  (primary encounter diagnosis)  Comment: has been having high BP without clinical sx, denies CP/SOB, encouraged him to keep working on life style modification   Plan: mentioned above     (Z13.89) Screening for diabetic peripheral neuropathy  Plan: FOOT EXAM  NO CHARGE [32704.114]            (I10) Hypertension goal BP (blood pressure) < 140/90  Comment: his BP recorded(172/122) during dental procedure, he had excruciating pain and anxiety during the visit, he had no clinical sx of hypertensive emergency nor crisis, will have him to increase the dose of lisinopril to 30mg and close monitoring until next dental procedure schedule. If it goes down under 142/93mmHg, he may be fine to do the dental procedure  Pt acknowledged and agreed with the plan   Plan: lisinopril (PRINIVIL/ZESTRIL) 20 MG tablet            (E11.9) Type 2 diabetes mellitus without complication, unspecified long term insulin use status (H)  Comment: has high BP and obesity  Plan: encouraged him to keep working on life style modification         Raul Melendez MD  [place]

## 2017-10-26 NOTE — NURSING NOTE
"Chief Complaint   Patient presents with     Hypertension       Initial BP (!) 142/93 (Cuff Size: Adult Large)  Pulse 104  Temp 97.8  F (36.6  C)  Resp 18  Ht 6' 4\" (1.93 m)  Wt (!) 342 lb (155.1 kg)  SpO2 97%  BMI 41.63 kg/m2 Estimated body mass index is 41.63 kg/(m^2) as calculated from the following:    Height as of this encounter: 6' 4\" (1.93 m).    Weight as of this encounter: 342 lb (155.1 kg).  Medication Reconciliation: complete   Jennifer Morrison, CMA    "

## 2017-10-27 ENCOUNTER — ALLIED HEALTH/NURSE VISIT (OUTPATIENT)
Dept: NURSING | Facility: CLINIC | Age: 37
End: 2017-10-27

## 2017-10-27 VITALS
DIASTOLIC BLOOD PRESSURE: 100 MMHG | BODY MASS INDEX: 38.36 KG/M2 | SYSTOLIC BLOOD PRESSURE: 145 MMHG | HEIGHT: 76 IN | WEIGHT: 315 LBS | HEART RATE: 79 BPM

## 2017-10-27 DIAGNOSIS — I10 HYPERTENSION GOAL BP (BLOOD PRESSURE) < 140/90: Primary | ICD-10-CM

## 2017-10-27 PROCEDURE — 99207 ZZC NO CHARGE NURSE ONLY: CPT | Performed by: FAMILY MEDICINE

## 2017-10-27 NOTE — Clinical Note
Mee He had concerns about going off lisinopril but is ok with proceeding.  He has blood pressure cuff and will check twice daily for 30d..  Hopefully blood pressure will be ok now that his tooth is gone.

## 2017-10-27 NOTE — PATIENT INSTRUCTIONS
"[facility] Hypertension Study   Visit 1     Thank you for your interest in the [facility] hypertension research study.    At the visit today we swabbed your cheeks to obtain a genetic test that will be used to guide your blood pressure treatment.      The research protocol requires that a patient currently on 1 blood pressure medicine stop or taper off of their blood pressure medication before starting genetically guided treatment.This is called a \"washout period\" and allows your body time to remove this medicine.  This process has been safely done in prior blood pressure studies.  You should have been given instructions on how to do this today.  If you still have questions please contact the research coordinator at [phone].    In the coming days you will be contacted by a research team member to schedule your next office visit.  After it is scheduled, be sure to let the research coordinator know as soon as possible if you cannot make it so that the visit can be rescheduled for you.     As a participant in the [facility] for Hypertension Study you will be asked to use a blood pressure cuff for the first 30 days of your study participation to see how your blood pressure is when you are off of your blood pressure medication. You are being asked to wear the blood pressure cuff to measure your blood pressure twice daily. Please ensure that measurements are taken in the morning after a five minute resting period and before exercising, eating, or consuming alcohol or caffeine products. Wait at least 30 minutes after showering before taking measurement.    You will be asked to enter these daily blood pressure measurements onto a paper log. At the end of each week, you will need to enter your blood pressure values into the online blood pressure log provided by "[employer]" via  email link. If you are completing the surveys on paper, please return the paper blood pressure log to your clinic during your second study visit.     **IMPORTANT** If " during these 30 days you record a systolic reading of 170  or higher or a diastolic reading of 110 or higher for one of your two blood pressure readings, wait 2-3 minutes and take a third blood pressure measurement. We ask that you then call the [employer] 24/7 triage system at [phone] if you record two high blood pressure readings. In addition, please contact the [employer] triage system if you experience any symptoms that may be related to hypertension.     If you record blood pressure at this level and also experience symptoms such  as chest pain, shortness of breath, numbness/weakness, change in vision or difficulty speaking call 911      In four weeks, your hypertension medications will be prescribed via phone or through [employer].     If you have any questions or concerns about the study please contact the research coordinator at [phone]. If your phone number, email or address changes please alert the research coordinator.    In the meantime, we ask that you complete the online surveys emailed out to  you, if completing online, or mailed out to you, if completing paper surveys,  before your next visit.    Lifestyle changes that can help control high blood pressure:  Even though PGEN is a study to test effectiveness of genetically guided medications for managing high blood pressure, there are several things you can do to ensure your blood pressure stays in good control:    Maintain a healthy weight (BMI<26). A modest amount of weight loss can be helpful    Limit salt intake to under 2400mg daily    Follow the DASH diet (lean meats, low salt, whole grains, lots of fruits/vegies)    Stay active, try to get in 30 minutes of exercise daily.    Manage your daily stress.    Do not smoke cigarettes (or cut back)    Limit alcohol (2 drinks/day for men, 1 drink/day for women)

## 2017-10-27 NOTE — PROGRESS NOTES
"PGEN study visit :  Uncontrolled hypertension arm , first visit      SUBJECTIVE:  Patient is here for first PGEN research study visit. Please refer to research tab in the header for further details. Patient has uncontrolled hypertension and has a goal of 140/90 (per Problem list target chosen by PCP)     PCP note reviewed.  Current blood pressure medication is : lisinopril 20mg daily  (he was just advised to increase to 30 though has only had 1 dose of 30mg)       Current diet & lifestyle: active though wt has been increasing again.    Smoking: no  Alcohol consumption minimal   Other pertinent history recent bad tooth. It was pulled today.  Pain explains very high blood pressure lately.  He has NO insurance       BP (!) 150/100 (BP Location: Left arm, Cuff Size: Adult Large)  Pulse 79  Ht 6' 3.5\" (1.918 m)  Wt (!) 340 lb 12.8 oz (154.6 kg)  BMI 42.03 kg/m2  Body mass index is 42.03 kg/(m^2).    Estimated body mass index is 42.03 kg/(m^2) as calculated from the following:    Height as of this encounter: 6' 3.5\" (1.918 m).    Weight as of this encounter: 340 lb 12.8 oz (154.6 kg).      Current Outpatient Prescriptions on File Prior to Visit:  Aspirin 500 MG TABS Take by mouth every 4 hours   lisinopril (PRINIVIL/ZESTRIL) 20 MG tablet Take 1.5 tablets (30 mg) by mouth daily   metFORMIN (GLUCOPHAGE) 500 MG tablet Take 1 tablet (500 mg) by mouth daily (with dinner) X 3 days.  Then increase to 1 tablet BID with meals x 3 days, then 2 tabs with breakfast & 1 tab with dinner x 3 days, then 2 tabs BID with meals thereafter.     No current facility-administered medications on file prior to visit.     Last Basic Metabolic Panel:  Lab Results   Component Value Date     10/03/2017      Lab Results   Component Value Date    POTASSIUM 4.0 10/03/2017     Lab Results   Component Value Date    CHLORIDE 101 10/03/2017     Lab Results   Component Value Date    NEDRA 9.3 10/03/2017     Lab Results   Component Value Date    CO2 " "28 10/03/2017     Lab Results   Component Value Date    BUN 15 10/03/2017     Lab Results   Component Value Date    CR 0.73 10/03/2017     Lab Results   Component Value Date     10/03/2017        A baseline potassium, creatinine, BUN, GFR has been done within past 12 months      OBJECTIVE:  Patient in in no apparent distress and able to provide full history for today's encounter. he  denies pain or any current illness   BP (!) 150/100 (BP Location: Left arm, Cuff Size: Adult Large)  Pulse 79  Ht 6' 3.5\" (1.918 m)  Wt (!) 340 lb 12.8 oz (154.6 kg)  BMI 42.03 kg/m2  Body mass index is 42.03 kg/(m^2).  Estimated body mass index is 42.03 kg/(m^2) as calculated from the following:    Height as of this encounter: 6' 3.5\" (1.918 m).    Weight as of this encounter: 340 lb 12.8 oz (154.6 kg).    Today's BP completed using cuff size: large on left side  arm.    Is pulse 55 or greater? - Yes  Other Exam findings :           ASSESSMENT/PLAN  (I10)  Hypertension goal BP (blood pressure) < 140/90 (primary encounter diagnosis). PGEN  enrollment/first study visit.      Consent obtained and documented and research folder provided to patient today.    Patient consents to washout period of 4 weeks without blood pressure medications: Yes    Cheek swabs (genetic profile test) was obtained and provided to clinic lab today : Yes    Patient should NOT be provided genetic profile results until the end of the study (this is a single blinded study.  Clinicians will use genetic profile (see media tab) results to chose order of blood pressure medications in patients randomized to the intervention arm.   Patients will remain blinded to results until the end of the study)    Full research packet also provide to patient .  Our research team will reach out to patient to schedule follow up visit as well.     Patient was counseled regarding the lifestyle changes (listed below)  to help with BP management Yes  Lifestyle changes that can " help control high blood pressure:  Even though PGEN is a study to test effectiveness of genetically guided medications for managing high blood pressure, there are several things you can do to ensure your blood pressure stays in good control:    Maintain a healthy weight (BMI<26). A modest amount of weight loss can be helpful    Limit salt intake to under 2400mg daily    Follow the DASH diet (lean meats, low salt, whole grains, lots of fruits/vegies)    Stay active, try to get in 30 minutes of exercise daily.    Manage your daily stress.    Do not smoke cigarettes (or cut back)    Limit alcohol (2 drinks/day for men, 1 drink/day for women)  Was AVS  provided to patient with the relevant <dot>PGENPI dot phrase pulled into patient instructions Yes    Patient was given an opportunity to ask questions.  Patient verbalized understanding of this plan and is agreeable to continuing with this research study    Vandana Sánchez MD

## 2017-10-27 NOTE — MR AVS SNAPSHOT
"              After Visit Summary   10/27/2017    John Dailey    MRN: [id number]           Patient Information     Date Of Birth          1980        Visit Information        Provider Department      10/27/2017 1:30 PM Vandana Sánchez MD [facility] Instructions    [facility] Hypertension Study   Visit 1     Thank you for your interest in the [facility] hypertension research study.    At the visit today we swabbed your cheeks to obtain a genetic test that will be used to guide your blood pressure treatment.      The research protocol requires that a patient currently on 1 blood pressure medicine stop or taper off of their blood pressure medication before starting genetically guided treatment.This is called a \"washout period\" and allows your body time to remove this medicine.  This process has been safely done in prior blood pressure studies.  You should have been given instructions on how to do this today.  If you still have questions please contact the research coordinator at [phone].    In the coming days you will be contacted by a research team member to schedule your next office visit.  After it is scheduled, be sure to let the research coordinator know as soon as possible if you cannot make it so that the visit can be rescheduled for you.     As a participant in the [facility] for Hypertension Study you will be asked to use a blood pressure cuff for the first 30 days of your study participation to see how your blood pressure is when you are off of your blood pressure medication. You are being asked to wear the blood pressure cuff to measure your blood pressure twice daily. Please ensure that measurements are taken in the morning after a five minute resting period and before exercising, eating, or consuming alcohol or caffeine products. Wait at least 30 minutes after showering before taking measurement.    You will be asked to enter these daily blood pressure measurements onto a paper " log. At the end of each week, you will need to enter your blood pressure values into the online blood pressure log provided by [employer] via  email link. If you are completing the surveys on paper, please return the paper blood pressure log to your clinic during your second study visit.     **IMPORTANT** If during these 30 days you record a systolic reading of 170  or higher or a diastolic reading of 110 or higher for one of your two blood pressure readings, wait 2-3 minutes and take a third blood pressure measurement. We ask that you then call the [employer] 24/7 triage system at [phone] if you record two high blood pressure readings. In addition, please contact the [employer] triage system if you experience any symptoms that may be related to hypertension.     If you record blood pressure at this level and also experience symptoms such  as chest pain, shortness of breath, numbness/weakness, change in vision or difficulty speaking call 911      In four weeks, your hypertension medications will be prescribed via phone or through [employer].     If you have any questions or concerns about the study please contact the research coordinator at [phone]. If your phone number, email or address changes please alert the research coordinator.    In the meantime, we ask that you complete the online surveys emailed out to  you, if completing online, or mailed out to you, if completing paper surveys,  before your next visit.    Lifestyle changes that can help control high blood pressure:  Even though PGEN is a study to test effectiveness of genetically guided medications for managing high blood pressure, there are several things you can do to ensure your blood pressure stays in good control:    Maintain a healthy weight (BMI<26). A modest amount of weight loss can be helpful    Limit salt intake to under 2400mg daily    Follow the DASH diet (lean meats, low salt, whole grains, lots of fruits/vegies)    Stay active, try to get  "in 30 minutes of exercise daily.    Manage your daily stress.    Do not smoke cigarettes (or cut back)    Limit alcohol (2 drinks/day for men, 1 drink/day for women)            Follow-ups after your visit        Who to contact     If you have questions or need follow up information about today's clinic visit or your schedule please contact [facility] directly at [phone].  Normal or non-critical lab and imaging results will be communicated to you by [employer], letter or phone within 4 business days after the clinic has received the results. If you do not hear from us within 7 days, please contact the clinic through [employer] or phone. If you have a critical or abnormal lab result, we will notify you by phone as soon as possible.  Submit refill requests through [employer] or call your pharmacy and they will forward the refill request to us. Please allow 3 business days for your refill to be completed.          Additional Information About Your Visit        [employer] Information     [employer] gives you secure access to your electronic health record. If you see a primary care provider, you can also send messages to your care team and make appointments. If you have questions, please call your primary care clinic.  If you do not have a primary care provider, please call [phone] and they will assist you.        Care EveryWhere ID     This is your Care EveryWhere ID. This could be used by other organizations to access your [facility] [place] medical records  [id number]        Your Vitals Were     Pulse Height BMI (Body Mass Index)             79 6' 3.5\" (1.918 m) 42.03 kg/m2          Blood Pressure from Last 3 Encounters:   10/27/17 (!) 145/100   10/26/17 (!) 142/93   10/13/17 (!) 154/101    Weight from Last 3 Encounters:   10/27/17 (!) 340 lb 12.8 oz (154.6 kg)   10/26/17 (!) 342 lb (155.1 kg)   10/03/17 (!) 340 lb (154.2 kg)              Today, you had the following     No orders found for display       Primary " Care Provider Office Phone # Fax #    Babar Oliveira [phone] [address] [facility] [address]        Equal Access to Services     LESTERLAMBERT SEAN : Jay yessica saravia adela Fong, waromeda luqadaha, qaybta kaalmada herman, andrea garces hareshmartin carter lalidiastephania cruz. So [facility] [phone].    ATENCIÓN: Si habla español, tiene a wilkins disposición servicios gratuitos de asistencia lingüística. Llame al [phone].    We comply with applicable federal civil rights laws and [place] laws. We do not discriminate on the basis of race, color, national origin, age, disability, sex, sexual orientation, or gender identity.            Thank you!     Thank you for choosing [facility]  for your care. Our goal is always to provide you with excellent care. Hearing back from our patients is one way we can continue to improve our services. Please take a few minutes to complete the written survey that you may receive in the mail after your visit with us. Thank you!             Your Updated Medication List - Protect others around you: Learn how to safely use, store and throw away your medicines at www.disposemymeds.org.          This list is accurate as of: 10/27/17  1:39 PM.  Always use your most recent med list.                   Brand Name Dispense Instructions for use Diagnosis    Aspirin 500 MG Tabs      Take by mouth every 4 hours        lisinopril 20 MG tablet    PRINIVIL/ZESTRIL    90 tablet    Take 1.5 tablets (30 mg) by mouth daily    Hypertension goal BP (blood pressure) < 140/90       metFORMIN 500 MG tablet    GLUCOPHAGE    180 tablet    Take 1 tablet (500 mg) by mouth daily (with dinner) X 3 days.  Then increase to 1 tablet BID with meals x 3 days, then 2 tabs with breakfast & 1 tab with dinner x 3 days, then 2 tabs BID with meals thereafter.    Uncontrolled type 2 diabetes mellitus with complication, without long-term current use of insulin (H)

## 2017-11-08 ENCOUNTER — TRANSFERRED RECORDS (OUTPATIENT)
Dept: HEALTH INFORMATION MANAGEMENT | Facility: CLINIC | Age: 37
End: 2017-11-08

## 2017-11-24 ENCOUNTER — TELEPHONE (OUTPATIENT)
Dept: FAMILY MEDICINE | Facility: CLINIC | Age: 37
End: 2017-11-24

## 2017-11-24 DIAGNOSIS — I10 HYPERTENSION GOAL BP (BLOOD PRESSURE) < 140/90: Primary | ICD-10-CM

## 2017-11-24 RX ORDER — LISINOPRIL 30 MG/1
30 TABLET ORAL DAILY
Qty: 90 TABLET | Refills: 1 | Status: SHIPPED | OUTPATIENT
Start: 2017-11-24 | End: 2020-07-28

## 2017-11-24 NOTE — TELEPHONE ENCOUNTER
This patient is scheduled to be started on medications based on PGEN protocol soon.  he has been randomized to JNC8 standard of care arm.   I have pended the order to reflect this standing order as per our study protocol.     Please also do NOT share which group he has been randomized to.     If you agree with the recommendations in the order:  1.  Please sign the pended PGEN RN HTN MGNT order and route this back to Shante Weeks (PGEN ) so she can contact the patient.  2. Please sign the pended  prescription and send to the patient's preferred pharmacy and Rosita will advise the patient the prescription has been sent. She will also arrange for study follow-up visit.     If you do NOT agree with the standing order, please route back to me with the changes you would like to see and I can then modify the order to reflect your adjustment based on clinical judgement.       Vandana Sánchez MD  PGEN study

## 2018-02-09 ENCOUNTER — OFFICE VISIT (OUTPATIENT)
Dept: FAMILY MEDICINE | Facility: CLINIC | Age: 38
End: 2018-02-09

## 2018-02-09 VITALS
BODY MASS INDEX: 38.36 KG/M2 | HEART RATE: 112 BPM | HEIGHT: 76 IN | DIASTOLIC BLOOD PRESSURE: 84 MMHG | TEMPERATURE: 98.3 F | SYSTOLIC BLOOD PRESSURE: 138 MMHG | WEIGHT: 315 LBS | OXYGEN SATURATION: 97 %

## 2018-02-09 DIAGNOSIS — E11.9 TYPE 2 DIABETES MELLITUS WITHOUT COMPLICATION, UNSPECIFIED LONG TERM INSULIN USE STATUS: ICD-10-CM

## 2018-02-09 DIAGNOSIS — L02.91 ABSCESS: Primary | ICD-10-CM

## 2018-02-09 LAB — HBA1C MFR BLD: 11.6 % (ref 4.3–6)

## 2018-02-09 PROCEDURE — 99213 OFFICE O/P EST LOW 20 MIN: CPT | Performed by: PHYSICIAN ASSISTANT

## 2018-02-09 PROCEDURE — 83036 HEMOGLOBIN GLYCOSYLATED A1C: CPT | Performed by: PHYSICIAN ASSISTANT

## 2018-02-09 PROCEDURE — 36415 COLL VENOUS BLD VENIPUNCTURE: CPT | Performed by: PHYSICIAN ASSISTANT

## 2018-02-09 RX ORDER — SULFAMETHOXAZOLE/TRIMETHOPRIM 800-160 MG
1 TABLET ORAL 2 TIMES DAILY
Qty: 20 TABLET | Refills: 0 | Status: SHIPPED | OUTPATIENT
Start: 2018-02-09 | End: 2020-07-28

## 2018-02-09 NOTE — PROGRESS NOTES
SUBJECTIVE:   John Dailey is a 37 year old male who presents to clinic today for the following health issues:    Rash  Onset: x 2 days, pt thinks its a staph infection, because it looks infected.    Description:   Location: Groin area  Character: raised, painful, burning, red, when he moves or adjusts it hurts; also tender to the touch  Itching (Pruritis): no     Progression of Symptoms:  Pt says its getting bigger, looks like there's a pocket.    Accompanying Signs & Symptoms:  Fever: YES- Pt felt warm last and was at 99, had chills and sweat  Body aches or joint pain: no   Sore throat symptoms: no   Recent cold symptoms: no     History:   Previous similar rash: YES- Pt says he had something on his neck (cyst?) back in March of 2016. Took antibiotics for that    Precipitating factors:   Exposure to similar rash: no   New exposures: None   Recent travel: no     Alleviating factors:  Ice has helped very little    Therapies Tried and outcome: Ibuprofen for the swelling and for his temp    Last ibuprofen dose 1.5 hours ago    He believes this started as an ingrown hair  Redness has spread since this started  Has a lot of tenderness  No drainage  States that heat was too uncomfortable    On 2,000mg of metformin daily. Reports that his weight has been fluctuating  Has not been seen for diabetes since October 2017, when he was restarted on metformin    Problem list and histories reviewed & adjusted, as indicated.  Additional history: as documented    Patient Active Problem List   Diagnosis     TBI (traumatic brain injury) (H)     Hypertension goal BP (blood pressure) < 140/90     Nephrolithiasis     YULIA (obstructive sleep apnea)     Hyperlipidemia with target LDL less than 70     Morbid obesity (H)     Type 2 diabetes mellitus without complication (H)     Uncontrolled type 2 diabetes mellitus with complication, without long-term current use of insulin (H)     Past Surgical History:   Procedure Laterality Date      DENTAL SURGERY  1997    wisdom teeth     SURGICAL HISTORY OF -   2002, 2003    kidney stone removal       Social History   Substance Use Topics     Smoking status: Never Smoker     Smokeless tobacco: Never Used     Alcohol use 0.0 - 0.5 oz/week     0 - 1 Standard drinks or equivalent per week      Comment: 1 or 2 drinks yearly     Family History   Problem Relation Age of Onset     DIABETES Mother      prediabetes     Hypertension Father      C.A.D. Father      MI age 58     CEREBROVASCULAR DISEASE Father      DIABETES Brother 35     Insulin dependent     Glaucoma Maternal Grandmother      C.A.D. Maternal Grandfather      Respiratory Brother      YULIA         Current Outpatient Prescriptions   Medication Sig Dispense Refill     sulfamethoxazole-trimethoprim (BACTRIM DS/SEPTRA DS) 800-160 MG per tablet Take 1 tablet by mouth 2 times daily 20 tablet 0     lisinopril (PRINIVIL,ZESTRIL) 30 MG tablet Take 1 tablet (30 mg) by mouth daily 90 tablet 1     lisinopril (PRINIVIL/ZESTRIL) 20 MG tablet Take 1.5 tablets (30 mg) by mouth daily 90 tablet 1     metFORMIN (GLUCOPHAGE) 500 MG tablet Take 1 tablet (500 mg) by mouth daily (with dinner) X 3 days.  Then increase to 1 tablet BID with meals x 3 days, then 2 tabs with breakfast & 1 tab with dinner x 3 days, then 2 tabs BID with meals thereafter. 180 tablet 1     Aspirin 500 MG TABS Take by mouth every 4 hours       Allergies   Allergen Reactions     Augmentin [Amoxicillin-Pot Clavulanate] Anaphylaxis     ?     Alcohol Other (See Comments)     Patient is allergic to Beer, His tongue swells     Beer      Latex        Reviewed and updated as needed this visit by clinical staff       Reviewed and updated as needed this visit by Provider         ROS:  Constitutional, HEENT, cardiovascular, pulmonary, gi and gu systems are negative, except as otherwise noted.    OBJECTIVE:     /84 (BP Location: Right arm, Patient Position: Sitting, Cuff Size: Adult Large)  Pulse 112  Temp  "98.3  F (36.8  C) (Oral)  Ht 6' 3.5\" (1.918 m)  Wt (!) 335 lb (152 kg)  SpO2 97%  BMI 41.32 kg/m2  Body mass index is 41.32 kg/(m^2).  GENERAL: healthy, alert and no distress  RESP: lungs clear to auscultation - no rales, rhonchi or wheezes  CV: regular rate and rhythm, normal S1 S2, no S3 or S4, no murmur, click or rub, no peripheral edema and peripheral pulses strong  MS: no gross musculoskeletal defects noted, no edema  SKIN: Indurated area of erythema, approx 5cm x 5cm on mons pubis. Tender to palpation. No drainage with pressure to the area    Diagnostic Test Results:  Results for orders placed or performed in visit on 02/09/18 (from the past 24 hour(s))   HEMOGLOBIN A1C   Result Value Ref Range    Hemoglobin A1C 11.6 (H) 4.3 - 6.0 %       ASSESSMENT/PLAN:     1. Abscess  Not amenable to I&D at this time. Recommend antibiotic and frequent warm compresses. Cover the area if drainage starts. Follow-up for recheck next week if improving with antibiotic. Be seen urgently for spreading redness, increasing pain, high fevers, particularly if redness spreads after 24-48 hours of antibiotic treatment.  - sulfamethoxazole-trimethoprim (BACTRIM DS/SEPTRA DS) 800-160 MG per tablet; Take 1 tablet by mouth 2 times daily  Dispense: 20 tablet; Refill: 0    2. Type 2 diabetes mellitus without complication, unspecified long term insulin use status (H)  Discussed that uncontrolled diabetes is a risk factor for abscess formation. Will need follow-up appt to address his diabetes.  - HEMOGLOBIN A1C    Hailey Noyola PA-C  [facility]  "

## 2018-02-09 NOTE — MR AVS SNAPSHOT
After Visit Summary   2/9/2018    John Dailey    MRN: [id number]           Patient Information     Date Of Birth          1980        Visit Information        Provider Department      2/9/2018 11:20 AM Hailey Noyola PA-C [facility]        Today's Diagnoses     Abscess    -  1    Type 2 diabetes mellitus without complication, unspecified long term insulin use status (H)          Care Instructions      -If redness is spreading after being on the antibiotic for 24-48 hours, be seen in urgent care for recheck  -If improving with antibiotic, recommend recheck in clinic next week  * ABSCESS [Antibiotic treatment only]  An abscess (sometimes called a  boil ) occurs when bacteria get trapped under the skin and begin to grow. Pus forms inside the abscess as the body responds to the bacteria. An abscess can occur with an insect bite, ingrown hair, blocked oil gland, pimple, cyst, or puncture wound.  In the early stages, redness and tenderness are the only symptoms. Sometimes, this stage can be treated with antibiotics alone. If the abscess does not respond to antibiotic treatment, it will need to be drained with a small cut, under local anesthesia.  HOME CARE:    Soak the wound in hot water or apply hot packs (small towel soaked in hot water) to the area for 20 minutes at a time. Do this three to four times a day.    Apply antibiotic cream or ointment such as Bacitracin or Polysporin onto the skin 3-4 times a day, unless something else was prescribed.  Neosporin Plus  includes an antibiotic plus a local pain reliever.    Take all of the antibiotics until they are gone.    You may use acetaminophen (Tylenol) or ibuprofen (Motrin, Advil) to control pain, unless another pain medicine was prescribed. [ NOTE : If you have chronic liver or kidney disease or ever had a stomach ulcer or GI bleeding, talk with your doctor before using these any of these.]  FOLLOW UP as advised by our staff.  Look at your wound each day for the signs of worsening infection listed below.  GET PROMPT MEDICAL ATTENTION if any of the following occur:    An increase in redness or swelling    Red streaks in the skin leading away from the abscess    An increase in local pain or swelling    Fever of 100.4 F (38 C) or higher, or as directed by your healthcare provider    Pus or fluid coming from the abscess    3235-8651 [employer]. [address] [place] [address]. All rights reserved. This information is not intended as a substitute for professional medical care. Always follow your healthcare professional's instructions.  This information has been modified by your health care provider with permission from the publisher.            Follow-ups after your visit        Who to contact     If you have questions or need follow up information about today's clinic visit or your schedule please contact [facility] directly at [phone].  Normal or non-critical lab and imaging results will be communicated to you by MyChart, letter or phone within 4 business days after the clinic has received the results. If you do not hear from us within 7 days, please contact the clinic through Popular Payshart or phone. If you have a critical or abnormal lab result, we will notify you by phone as soon as possible.  Submit refill requests through [email or web address] or call your pharmacy and they will forward the refill request to us. Please allow 3 business days for your refill to be completed.          Additional Information About Your Visit        MyChart Information     [email or web address] gives you secure access to your electronic health record. If you see a primary care provider, you can also send messages to your care team and make appointments. If you have questions, please call your primary care clinic.  If you do not have a primary care provider, please call [phone] and they will assist you.        Care EveryWhere ID     This is your  "Care EveryWhere ID. This could be used by other organizations to access your [facility] medical records  [id number]        Your Vitals Were     Pulse Temperature Height Pulse Oximetry BMI (Body Mass Index)       112 98.3  F (36.8  C) (Oral) 6' 3.5\" (1.918 m) 97% 41.32 kg/m2        Blood Pressure from Last 3 Encounters:   02/09/18 138/84   10/27/17 (!) 145/100   10/26/17 (!) 142/93    Weight from Last 3 Encounters:   02/09/18 (!) 335 lb (152 kg)   10/27/17 (!) 340 lb 12.8 oz (154.6 kg)   10/26/17 (!) 342 lb (155.1 kg)              We Performed the Following     HEMOGLOBIN A1C          Today's Medication Changes          These changes are accurate as of 2/9/18 11:42 AM.  If you have any questions, ask your nurse or doctor.               Start taking these medicines.        Dose/Directions    sulfamethoxazole-trimethoprim 800-160 MG per tablet   Commonly known as:  BACTRIM DS/SEPTRA DS   Used for:  Abscess   Started by:  Hailey Noyola PA-C        Dose:  1 tablet   Take 1 tablet by mouth 2 times daily   Quantity:  20 tablet   Refills:  0            Where to get your medicines      These medications were sent to [facility] [address] [place] [address] AT [place] [address] & [address] [address]    Hours:  24-hours Phone:  [phone]     sulfamethoxazole-trimethoprim 800-160 MG per tablet                Primary Care Provider Office Phone # Fax #    Babar Oliveira [phone] [address] [facility] [address]        Equal Access to Services     LAMBERT ESTRADA AH: Jay Fong, waromeda luqadaha, qaybta kaalmada ademartinyajoey, andrea cruz. So [facility] [phone].    ATENCIÓN: Si habla español, tiene a wilkins disposición servicios gratuitos de asistencia lingüística. Llame al [phone].    We comply with applicable federal civil rights laws and [place] laws. We do not discriminate on the basis of " race, color, national origin, age, disability, sex, sexual orientation, or gender identity.            Thank you!     Thank you for choosing [facility]  for your care. Our goal is always to provide you with excellent care. Hearing back from our patients is one way we can continue to improve our services. Please take a few minutes to complete the written survey that you may receive in the mail after your visit with us. Thank you!             Your Updated Medication List - Protect others around you: Learn how to safely use, store and throw away your medicines at www.disposemymeds.org.          This list is accurate as of 2/9/18 11:42 AM.  Always use your most recent med list.                   Brand Name Dispense Instructions for use Diagnosis    Aspirin 500 MG Tabs      Take by mouth every 4 hours        * lisinopril 20 MG tablet    PRINIVIL/ZESTRIL    90 tablet    Take 1.5 tablets (30 mg) by mouth daily    Hypertension goal BP (blood pressure) < 140/90       * lisinopril 30 MG tablet    PRINIVIL,ZESTRIL    90 tablet    Take 1 tablet (30 mg) by mouth daily    Hypertension goal BP (blood pressure) < 140/90       metFORMIN 500 MG tablet    GLUCOPHAGE    180 tablet    Take 1 tablet (500 mg) by mouth daily (with dinner) X 3 days.  Then increase to 1 tablet BID with meals x 3 days, then 2 tabs with breakfast & 1 tab with dinner x 3 days, then 2 tabs BID with meals thereafter.    Uncontrolled type 2 diabetes mellitus with complication, without long-term current use of insulin (H)       sulfamethoxazole-trimethoprim 800-160 MG per tablet    BACTRIM DS/SEPTRA DS    20 tablet    Take 1 tablet by mouth 2 times daily    Abscess       * Notice:  This list has 2 medication(s) that are the same as other medications prescribed for you. Read the directions carefully, and ask your doctor or other care provider to review them with you.

## 2018-02-09 NOTE — NURSING NOTE
"Chief Complaint   Patient presents with     Derm Problem       Initial /84 (BP Location: Right arm, Patient Position: Sitting, Cuff Size: Adult Large)  Pulse 112  Temp 98.3  F (36.8  C) (Oral)  Ht 6' 3.5\" (1.918 m)  Wt (!) 335 lb (152 kg)  SpO2 97%  BMI 41.32 kg/m2 Estimated body mass index is 41.32 kg/(m^2) as calculated from the following:    Height as of this encounter: 6' 3.5\" (1.918 m).    Weight as of this encounter: 335 lb (152 kg).  Medication Reconciliation: complete   Mee Christianson MA    "

## 2018-02-09 NOTE — PATIENT INSTRUCTIONS
-If redness is spreading after being on the antibiotic for 24-48 hours, be seen in urgent care for recheck  -If improving with antibiotic, recommend recheck in clinic next week  * ABSCESS [Antibiotic treatment only]  An abscess (sometimes called a  boil ) occurs when bacteria get trapped under the skin and begin to grow. Pus forms inside the abscess as the body responds to the bacteria. An abscess can occur with an insect bite, ingrown hair, blocked oil gland, pimple, cyst, or puncture wound.  In the early stages, redness and tenderness are the only symptoms. Sometimes, this stage can be treated with antibiotics alone. If the abscess does not respond to antibiotic treatment, it will need to be drained with a small cut, under local anesthesia.  HOME CARE:    Soak the wound in hot water or apply hot packs (small towel soaked in hot water) to the area for 20 minutes at a time. Do this three to four times a day.    Apply antibiotic cream or ointment such as Bacitracin or Polysporin onto the skin 3-4 times a day, unless something else was prescribed.  Neosporin Plus  includes an antibiotic plus a local pain reliever.    Take all of the antibiotics until they are gone.    You may use acetaminophen (Tylenol) or ibuprofen (Motrin, Advil) to control pain, unless another pain medicine was prescribed. [ NOTE : If you have chronic liver or kidney disease or ever had a stomach ulcer or GI bleeding, talk with your doctor before using these any of these.]  FOLLOW UP as advised by our staff. Look at your wound each day for the signs of worsening infection listed below.  GET PROMPT MEDICAL ATTENTION if any of the following occur:    An increase in redness or swelling    Red streaks in the skin leading away from the abscess    An increase in local pain or swelling    Fever of 100.4 F (38 C) or higher, or as directed by your healthcare provider    Pus or fluid coming from the abscess    5192-7294 [employer]. [address]  [place] [address]. All rights reserved. This information is not intended as a substitute for professional medical care. Always follow your healthcare professional's instructions.  This information has been modified by your health care provider with permission from the publisher.

## 2018-02-13 NOTE — PROGRESS NOTES
Dear John,    Your Hemoglobin A1c test came back significantly elevated, which means your blood sugars have been very high over the past 3 months. Please schedule a diabetic follow-up appointment with your primary provider.    Please contact the clinic if you have additional questions.  Thank you.    Sincerely,    Hailey Noyola PA-C

## 2018-02-27 ENCOUNTER — ALLIED HEALTH/NURSE VISIT (OUTPATIENT)
Dept: FAMILY MEDICINE | Facility: CLINIC | Age: 38
End: 2018-02-27

## 2018-02-27 ENCOUNTER — TELEPHONE (OUTPATIENT)
Dept: FAMILY MEDICINE | Facility: CLINIC | Age: 38
End: 2018-02-27

## 2018-02-27 VITALS — DIASTOLIC BLOOD PRESSURE: 112 MMHG | SYSTOLIC BLOOD PRESSURE: 162 MMHG

## 2018-02-27 DIAGNOSIS — I10 HYPERTENSION GOAL BP (BLOOD PRESSURE) < 140/90: Primary | ICD-10-CM

## 2018-02-27 PROCEDURE — 99207 ZZC NO CHARGE NURSE ONLY: CPT | Performed by: FAMILY MEDICINE

## 2018-02-27 NOTE — TELEPHONE ENCOUNTER
Patient was seen in the [place] clinic today and presented with a high blood pressure. A message was left for the patient to return a call to the study team as soon as possible. He should be seen by his PCP or other provider for follow up.     Previously, multiple attempts have been made by the research team to contact the patient with no response. A final notification letter was mailed to the patient on 2/16/2018 stating he would be withdrawn from the study if there was not an attempt to contact the research team within one week. No contact occurred.     The patient has been withdrawn from the [employer] study.     Malena Villafana

## 2018-02-27 NOTE — MR AVS SNAPSHOT
After Visit Summary   2/27/2018    John Dailey    MRN: [id number]           Patient Information     Date Of Birth          1980        Visit Information        Provider Department      2/27/2018 2:10 PM Babar Oliveira MD [facility]        Today's Diagnoses     Hypertension goal BP (blood pressure) < 140/90    -  1       Follow-ups after your visit        Who to contact     If you have questions or need follow up information about today's clinic visit or your schedule please contact [facility] directly at [phone].  Normal or non-critical lab and imaging results will be communicated to you by iBid2Savehart, letter or phone within 4 business days after the clinic has received the results. If you do not hear from us within 7 days, please contact the clinic through [employer] or phone. If you have a critical or abnormal lab result, we will notify you by phone as soon as possible.  Submit refill requests through SpotRight or call your pharmacy and they will forward the refill request to us. Please allow 3 business days for your refill to be completed.          Additional Information About Your Visit        MyChart Information     SpotRight gives you secure access to your electronic health record. If you see a primary care provider, you can also send messages to your care team and make appointments. If you have questions, please call your primary care clinic.  If you do not have a primary care provider, please call [phone] and they will assist you.        Care EveryWhere ID     This is your Care EveryWhere ID. This could be used by other organizations to access your [facility] medical records  [id number]         Blood Pressure from Last 3 Encounters:   02/27/18 (!) 162/112   02/09/18 138/84   10/27/17 (!) 145/100    Weight from Last 3 Encounters:   02/09/18 (!) 335 lb (152 kg)   10/27/17 (!) 340 lb 12.8 oz (154.6 kg)   10/26/17 (!) 342 lb (155.1 kg)              Today,  you had the following     No orders found for display       Primary Care Provider Office Phone # Fax #    Babar Oliveira [phone]       [address] [place] [id number]        Equal Access to Services     KERRI ESTRADA : Hadii yessica ku neilo Soteresitaali, waaxda luqadaha, qaybta kaalmada adeegyada, andrea basurton atif carter laemory cruz. So [facility] [phone].    ATENCIÓN: Si habla español, tiene a wilkins disposición servicios gratuitos de asistencia lingüística. Llame al [phone].    We comply with applicable federal civil rights laws and [place] laws. We do not discriminate on the basis of race, color, national origin, age, disability, sex, sexual orientation, or gender identity.            Thank you!     Thank you for choosing [facility]  for your care. Our goal is always to provide you with excellent care. Hearing back from our patients is one way we can continue to improve our services. Please take a few minutes to complete the written survey that you may receive in the mail after your visit with us. Thank you!             Your Updated Medication List - Protect others around you: Learn how to safely use, store and throw away your medicines at www.disposemymeds.org.          This list is accurate as of 2/27/18 11:59 PM.  Always use your most recent med list.                   Brand Name Dispense Instructions for use Diagnosis    Aspirin 500 MG Tabs      Take by mouth every 4 hours        * lisinopril 20 MG tablet    PRINIVIL/ZESTRIL    90 tablet    Take 1.5 tablets (30 mg) by mouth daily    Hypertension goal BP (blood pressure) < 140/90       * lisinopril 30 MG tablet    PRINIVIL,ZESTRIL    90 tablet    Take 1 tablet (30 mg) by mouth daily    Hypertension goal BP (blood pressure) < 140/90       metFORMIN 500 MG tablet    GLUCOPHAGE    180 tablet    Take 1 tablet (500 mg) by mouth daily (with dinner) X 3 days.  Then increase to 1 tablet BID with meals x 3 days, then 2  tabs with breakfast & 1 tab with dinner x 3 days, then 2 tabs BID with meals thereafter.    Uncontrolled type 2 diabetes mellitus with complication, without long-term current use of insulin (H)       sulfamethoxazole-trimethoprim 800-160 MG per tablet    BACTRIM DS/SEPTRA DS    20 tablet    Take 1 tablet by mouth 2 times daily    Abscess       * Notice:  This list has 2 medication(s) that are the same as other medications prescribed for you. Read the directions carefully, and ask your doctor or other care provider to review them with you.

## 2018-02-27 NOTE — PROGRESS NOTES
John Dailey is enrolled/participating in the retail pharmacy Blood Pressure Goals Achievement Program (BPGAP).  John Dailey was evaluated at [facility] on February 27, 2018 at which time his blood pressure was:    BP Readings from Last 3 Encounters:   02/27/18 (!) 162/112   02/09/18 138/84   10/27/17 (!) 145/100     Reviewed lifestyle modifications for blood pressure control and reduction: including making healthy food choices, managing weight, getting regular exercise, smoking cessation, reducing alcohol consumption, monitoring blood pressure regularly.     John Dailey is not experiencing symptoms.    Follow-Up: BP is not at goal of < 140/90mmHg (patient 18+ years of age with or without diabetes), Recommended follow-up with PCP.  Routing to PCP for further review.    Recommendation to Provider: Pt has been enrolled in PGEN study.  No recommendations were made today when he was in the pharmacy.  Coordination with PGEN study provider says that pt should be following up with primary as soon as possible.  Their office has made an attempt to reach him recently but pt has not been responsive to their calls/emails.      John Dailey was evaluated for enrollment into the PGEN study today.    Patient eligible for enrollment:  Yes  Patient interested in enrollment:  Yes- was currently enrolled, but  has recommended withdrawal from study at this time (non-compliance with study requirements/requests)    Completed by: Thank you,  Jayla Molina RP, r [facility] [phone]

## 2018-02-28 ENCOUNTER — TELEPHONE (OUTPATIENT)
Dept: FAMILY MEDICINE | Facility: CLINIC | Age: 38
End: 2018-02-28

## 2018-03-07 ENCOUNTER — ALLIED HEALTH/NURSE VISIT (OUTPATIENT)
Dept: FAMILY MEDICINE | Facility: CLINIC | Age: 38
End: 2018-03-07

## 2018-03-07 VITALS — DIASTOLIC BLOOD PRESSURE: 100 MMHG | SYSTOLIC BLOOD PRESSURE: 140 MMHG

## 2018-03-07 DIAGNOSIS — Z01.30 BP CHECK: Primary | ICD-10-CM

## 2018-03-07 PROCEDURE — 99207 ZZC NO CHARGE NURSE ONLY: CPT | Performed by: FAMILY MEDICINE

## 2018-03-07 NOTE — MR AVS SNAPSHOT
After Visit Summary   3/7/2018    John Dailey    MRN: [id number]           Patient Information     Date Of Birth          1980        Visit Information        Provider Department      3/7/2018 10:18 AM Babar Oliveira MD [facility]        Today's Diagnoses     BP check    -  1       Follow-ups after your visit        Who to contact     If you have questions or need follow up information about today's clinic visit or your schedule please contact [facility] directly at [phone].  Normal or non-critical lab and imaging results will be communicated to you by Rakutenhart, letter or phone within 4 business days after the clinic has received the results. If you do not hear from us within 7 days, please contact the clinic through Rakutenhart or phone. If you have a critical or abnormal lab result, we will notify you by phone as soon as possible.  Submit refill requests through Gruvie or call your pharmacy and they will forward the refill request to us. Please allow 3 business days for your refill to be completed.          Additional Information About Your Visit        MyChart Information     Gruvie gives you secure access to your electronic health record. If you see a primary care provider, you can also send messages to your care team and make appointments. If you have questions, please call your primary care clinic.  If you do not have a primary care provider, please call [phone] and they will assist you.        Care EveryWhere ID     This is your Care EveryWhere ID. This could be used by other organizations to access your [facility] medical records  [id number]         Blood Pressure from Last 3 Encounters:   03/07/18 (!) 140/100   02/27/18 (!) 162/112   02/09/18 138/84    Weight from Last 3 Encounters:   02/09/18 (!) 335 lb (152 kg)   10/27/17 (!) 340 lb 12.8 oz (154.6 kg)   10/26/17 (!) 342 lb (155.1 kg)              Today, you had the following     No orders  found for display       Primary Care Provider Office Phone # Fax #    Babar Oliveira [phone] [address] [facility] [address]        Equal Access to Services     KERRI ESTRADA : Hadii yessica ku neilo Soteresitaali, waaxda luqadaha, qaybta kaalmada adeegyada, andrea carter laJosemarilyn cruz. So [facility] [phone].    ATENCIÓN: Si habla español, tiene a wilkins disposición servicios gratuitos de asistencia lingüística. Llame al [phone].    We comply with applicable federal civil rights laws and [place] laws. We do not discriminate on the basis of race, color, national origin, age, disability, sex, sexual orientation, or gender identity.            Thank you!     Thank you for choosing [facility]  for your care. Our goal is always to provide you with excellent care. Hearing back from our patients is one way we can continue to improve our services. Please take a few minutes to complete the written survey that you may receive in the mail after your visit with us. Thank you!             Your Updated Medication List - Protect others around you: Learn how to safely use, store and throw away your medicines at www.disposemymeds.org.          This list is accurate as of 3/7/18 10:27 AM.  Always use your most recent med list.                   Brand Name Dispense Instructions for use Diagnosis    Aspirin 500 MG Tabs      Take by mouth every 4 hours        * lisinopril 20 MG tablet    PRINIVIL/ZESTRIL    90 tablet    Take 1.5 tablets (30 mg) by mouth daily    Hypertension goal BP (blood pressure) < 140/90       * lisinopril 30 MG tablet    PRINIVIL,ZESTRIL    90 tablet    Take 1 tablet (30 mg) by mouth daily    Hypertension goal BP (blood pressure) < 140/90       metFORMIN 500 MG tablet    GLUCOPHAGE    180 tablet    Take 1 tablet (500 mg) by mouth daily (with dinner) X 3 days.  Then increase to 1 tablet BID with meals x 3 days, then 2 tabs with breakfast & 1 tab with dinner  x 3 days, then 2 tabs BID with meals thereafter.    Uncontrolled type 2 diabetes mellitus with complication, without long-term current use of insulin (H)       sulfamethoxazole-trimethoprim 800-160 MG per tablet    BACTRIM DS/SEPTRA DS    20 tablet    Take 1 tablet by mouth 2 times daily    Abscess       * Notice:  This list has 2 medication(s) that are the same as other medications prescribed for you. Read the directions carefully, and ask your doctor or other care provider to review them with you.

## 2018-03-07 NOTE — PROGRESS NOTES
John Dailey is enrolled/participating in the retail pharmacy Blood Pressure Goals Achievement Program (BPGAP).  John Dailey was evaluated at [facility] on March 7, 2018 at which time his blood pressure was:    BP Readings from Last 3 Encounters:   03/07/18 (!) 140/100   02/27/18 (!) 162/112   02/09/18 138/84     Reviewed lifestyle modifications for blood pressure control and reduction: including making healthy food choices, managing weight, getting regular exercise, smoking cessation, reducing alcohol consumption, monitoring blood pressure regularly.     John Dailey is not experiencing symptoms.    Follow-Up: BP is not at goal of < 140/90mmHg (patient 18+ years of age with or without diabetes), Recommended follow-up with PCP.  Routing to PCP for further review.    Patient is already in PGEN study.    Completed by: Corinne [facility]   [phone]

## 2018-12-18 ENCOUNTER — TELEPHONE (OUTPATIENT)
Dept: FAMILY MEDICINE | Facility: CLINIC | Age: 38
End: 2018-12-18

## 2018-12-18 NOTE — TELEPHONE ENCOUNTER
Needs of attention regarding:  -High Blood Pressure    Health Maintenance Topics with due status: Overdue       Topic Date Due    EYE EXAM Q1 YEAR 03/12/1982    HIV SCREEN (SYSTEM ASSIGNED) 03/12/1999    A1C Q3 MO 05/09/2018    INFLUENZA VACCINE 09/01/2018    BMP Q1 YR 10/03/2018    LIPID MONITORING Q1 YEAR 10/03/2018    MICROALBUMIN Q1 YEAR 10/03/2018    FOOT EXAM Q1 YEAR 10/26/2018       Communication:  See Letter

## 2019-02-19 ENCOUNTER — TRANSFERRED RECORDS (OUTPATIENT)
Dept: HEALTH INFORMATION MANAGEMENT | Facility: CLINIC | Age: 39
End: 2019-02-19

## 2020-03-10 ENCOUNTER — HEALTH MAINTENANCE LETTER (OUTPATIENT)
Age: 40
End: 2020-03-10

## 2020-07-28 ENCOUNTER — VIRTUAL VISIT (OUTPATIENT)
Dept: FAMILY MEDICINE | Facility: CLINIC | Age: 40
End: 2020-07-28

## 2020-07-28 ENCOUNTER — NURSE TRIAGE (OUTPATIENT)
Dept: NURSING | Facility: CLINIC | Age: 40
End: 2020-07-28

## 2020-07-28 DIAGNOSIS — R81 GLUCOSURIA: ICD-10-CM

## 2020-07-28 DIAGNOSIS — R30.0 DYSURIA: ICD-10-CM

## 2020-07-28 DIAGNOSIS — B37.2 YEAST INFECTION OF THE SKIN: ICD-10-CM

## 2020-07-28 DIAGNOSIS — R10.9 LEFT FLANK PAIN: ICD-10-CM

## 2020-07-28 DIAGNOSIS — N20.0 NEPHROLITHIASIS: ICD-10-CM

## 2020-07-28 DIAGNOSIS — R63.4 UNINTENTIONAL WEIGHT LOSS: ICD-10-CM

## 2020-07-28 LAB
ALBUMIN UR-MCNC: NEGATIVE MG/DL
APPEARANCE UR: CLEAR
BILIRUB UR QL STRIP: NEGATIVE
COLOR UR AUTO: YELLOW
GLUCOSE UR STRIP-MCNC: >=1000 MG/DL
HGB UR QL STRIP: NEGATIVE
KETONES UR STRIP-MCNC: NEGATIVE MG/DL
LEUKOCYTE ESTERASE UR QL STRIP: NEGATIVE
NITRATE UR QL: NEGATIVE
PH UR STRIP: 5 PH (ref 5–7)
SOURCE: ABNORMAL
SP GR UR STRIP: 1.01 (ref 1–1.03)
UROBILINOGEN UR STRIP-ACNC: 0.2 EU/DL (ref 0.2–1)

## 2020-07-28 PROCEDURE — 99214 OFFICE O/P EST MOD 30 MIN: CPT | Mod: 95 | Performed by: NURSE PRACTITIONER

## 2020-07-28 PROCEDURE — 81003 URINALYSIS AUTO W/O SCOPE: CPT | Performed by: NURSE PRACTITIONER

## 2020-07-28 RX ORDER — FLUCONAZOLE 150 MG/1
150 TABLET ORAL ONCE
Qty: 1 TABLET | Refills: 0 | Status: SHIPPED | OUTPATIENT
Start: 2020-07-28 | End: 2020-07-28

## 2020-07-28 NOTE — PROGRESS NOTES
"John Dailey is a 40 year old male who is being evaluated via a billable telephone visit.      The patient has been notified of following:     \"This telephone visit will be conducted via a call between you and your physician/provider. We have found that certain health care needs can be provided without the need for a physical exam.  This service lets us provide the care you need with a short phone conversation.  If a prescription is necessary we can send it directly to your pharmacy.  If lab work is needed we can place an order for that and you can then stop by our lab to have the test done at a later time.    Telephone visits are billed at different rates depending on your insurance coverage. During this emergency period, for some insurers they may be billed the same as an in-person visit.  Please reach out to your insurance provider with any questions.    If during the course of the call the physician/provider feels a telephone visit is not appropriate, you will not be charged for this service.\"    Patient has given verbal consent for Telephone visit?  Yes    What phone number would you like to be contacted at? [phone]    How would you like to obtain your AVS? Ayesha    Subjective     John Dailey is a 40 year old male who presents via phone visit today for the following health issues:    HPI    Genitourinary - Male  Onset: x4-5 days    Description:   Dysuria (painful urination): YES - after   Hematuria (blood in urine): YES- couple days after kidney stone passed  Frequency: YES - every hour  Are you urinating at night : YES  Hesitancy (delay in urine): YES - sometimes  Retention (unable to empty): YES   Decrease in urinary flow: no   Incontinence: YES    Progression of Symptoms:  worsening kidney pain     Accompanying Signs & Symptoms:  Fever: no   Back/Flank pain: YES - left side from kidney stone - starting on R side  Urethral discharge: YES-   Testicle lumps/masses/pain: no   Nausea and/or " vomiting: no   Abdominal pain: YES- above hip to kidney - a lot pressure - left side     History:   History of frequent UTI's: YES- just when passes a kidney stone- not in 2 years  History of kidney stones: YES- passed 6 days ago - has passed often  History of hernias: no   Personal or Family history of Prostate problems: no  Sexually active: YES    Precipitating factors:   Passing a kidney stone    Alleviating factors:  Tylenol 3 tablets - dulls pain-minor relief    Has yeast infection in foreskin - using antifungal cream - requesting rx cream if appropriate or pill  Should/Could take a Probiotic to take to help stop yeast infections.     Pain with urination is after voiding.   Diagnosed with diabetes in 2015. Also has HTN. Previously was prescribed Metformin and Lisinopril both he discontinued. Reports occasional check of blood sugar and it runs 170. A1C as below. Patient has been losing weight without intention. Has not been seen for years and has concerns for cost. He reports he has frequent urination and hunger. Reports long standing history of kidney stones some requiring surgery. He reports approx. 20 previous kidney stones starting in his 20's last one was 2 years ago he feels he passed a stone last week and now has UTI. Reports monogamous relationship and declines STD testing.     Lab Results   Component Value Date    A1C 11.6 02/09/2018    A1C 11.5 10/03/2017    A1C 11.5 03/18/2015     BP Readings from Last 3 Encounters:   03/07/18 (!) 140/100   02/27/18 (!) 162/112   02/09/18 138/84       Patient Active Problem List   Diagnosis     TBI (traumatic brain injury) (H)     Hypertension goal BP (blood pressure) < 140/90     Nephrolithiasis     YULIA (obstructive sleep apnea)     Hyperlipidemia with target LDL less than 70     Morbid obesity (H)     Type 2 diabetes mellitus without complication (H)     Uncontrolled type 2 diabetes mellitus with complication, without long-term current use of insulin (H)     Past  Surgical History:   Procedure Laterality Date     DENTAL SURGERY  1997    wisdom teeth     SURGICAL HISTORY OF -   2002, 2003    kidney stone removal       Social History     Tobacco Use     Smoking status: Never Smoker     Smokeless tobacco: Never Used   Substance Use Topics     Alcohol use: Yes     Alcohol/week: 0.0 - 0.8 standard drinks     Comment: 1 or 2 drinks yearly     Family History   Problem Relation Age of Onset     Diabetes Mother         prediabetes     Hypertension Father      C.A.D. Father         MI age 58     Cerebrovascular Disease Father      Diabetes Brother 35        Insulin dependent     Glaucoma Maternal Grandmother      C.A.D. Maternal Grandfather      Respiratory Brother         YULIA         Current Outpatient Medications   Medication Sig Dispense Refill     fluconazole (DIFLUCAN) 150 MG tablet Take 1 tablet (150 mg) by mouth once for 1 dose 1 tablet 0     Allergies   Allergen Reactions     Augmentin [Amoxicillin-Pot Clavulanate] Anaphylaxis     ?     Alcohol Other (See Comments)     Patient is allergic to Beer, His tongue swells     Beer      Latex        Reviewed and updated as needed this visit by Provider  Tobacco  Allergies  Meds  Problems  Med Hx  Surg Hx  Fam Hx       Review of Systems   Constitutional, HEENT, cardiovascular, pulmonary, GI, , musculoskeletal, neuro, skin, endocrine and psych systems are negative, except as otherwise noted in the HPI.       Objective   Reported vitals:  There were no vitals taken for this visit.   healthy, alert and no distress  PSYCH: Alert and oriented times 3; coherent speech, normal   rate and volume, able to articulate logical thoughts, able   to abstract reason, no tangential thoughts, no hallucinations   or delusions  His affect is normal and pleasant  RESP: No cough, no audible wheezing, able to talk in full sentences  Remainder of exam unable to be completed due to telephone visits    Diagnostic Test Results:  Labs reviewed in  Epic  Urinalysis - UA RESULTS:  Recent Labs   Lab Test 07/28/20  1432  03/18/15  0857   COLOR Yellow   < > Brown   APPEARANCE Clear   < > Cloudy   URINEGLC >=1000*   < > 500*   URINEBILI Negative   < > Negative   URINEKETONE Negative   < > Negative   SG 1.010   < > 1.025   UBLD Negative   < > Large*   URINEPH 5.0   < > 5.5   PROTEIN Negative   < > 30*   UROBILINOGEN 0.2   < > 0.2   NITRITE Negative   < > Negative   LEUKEST Negative   < > Negative   RBCU  --   --  >100*   WBCU  --   --  O - 2    < > = values in this interval not displayed.           Assessment/Plan:    1. Uncontrolled type 2 diabetes mellitus with complication, without long-term current use of insulin (H)  2. Glucosuria  Very high sugar in urine-he reports blood sugars are 170 when he checks them.   Declines further eval today including blood work.Discussed the importance of good diabetic control to minimize organ damage (heart kidneys etc). He is currently on no medications.   He is okay with office visit this week for evaluation with labs. This is scheduled for Thursday.   Red flag symptoms discussed and if these occur present to the emergency room or call 911.  John verbalizes understanding of plan of care and is in agreement.      3. Nephrolithiasis  4. Left flank pain  History of stones; UA today with hematuria. However he reports flank pain. Encouraged imaging via HUB versus ordering outpatient. He declines any further evaluation due to cost.   In person evaluation this week including labs to assess kidney function.   Red flag symptoms discussed and if these occur present to the emergency room or call 911.  John verbalizes understanding of plan of care and is in agreement.   - *UA reflex to Microscopic and Culture ([place] and [facility] (except [place] and [place]); Future    5. Dysuria  UA without signs of infection.   Has what sounds like yeast infection could be causing this discomfort after voiding.   - *UA reflex to  Microscopic and Culture ([place] and [facility] (except [place] and [place]); Future    6. Yeast infection of the skin  One dose of diflucan-discussed high sugars causing yeast infections.   - fluconazole (DIFLUCAN) 150 MG tablet; Take 1 tablet (150 mg) by mouth once for 1 dose  Dispense: 1 tablet; Refill: 0    7. Unintentional weight loss  Needs evaluation.   Follow up this week in person with exam and labs.     Return in about 2 days (around 7/30/2020) for Diabetic follow up/on no meds-glucosuria, wt loss. Needs face to face exam and labs. .    Phone call duration:  10 minutes          ELOY Henao CNP

## 2020-07-28 NOTE — TELEPHONE ENCOUNTER
John feels he has a bladder infection and yeast infection.  John cannot pull back foreskin and creamy white paste between skin.  John is having painful urination.  Denies fever cough and shortness of breath.      COVID 19 Nurse Triage Plan/Patient Instructions    Please be aware that novel coronavirus (COVID-19) may be circulating in the community. If you develop symptoms such as fever, cough, or SOB or if you have concerns about the presence of another infection including coronavirus (COVID-19), please contact your health care provider or visit [email or web address].     Disposition/Instructions    Virtual Visit with provider recommended. Reference Visit Selection Guide.    Thank you for taking steps to prevent the spread of this virus.  o Limit your contact with others.  o Wear a simple mask to cover your cough.  o Wash your hands well and often.    Resources    [facility]: About COVID-19: [email or web address]    CDC: What to Do If You're Sick: www.cdc.gov/coronavirus/2019-ncov/about/steps-when-sick.html    CDC: Ending Home Isolation: www.cdc.gov/coronavirus/2019-ncov/hcp/disposition-in-home-patients.html     CDC: Caring for Someone: www.cdc.gov/coronavirus/2019-ncov/if-you-are-sick/care-for-someone.html     [facility]: Interim Guidance for Hospital Discharge to Home: www.health.[facility].mn.us/diseases/coronavirus/hcp/hospdischarge.pdf    [facility] clinical trials (COVID-19 research studies): clinicalaffairs.[facility]-clinical-trials     Below are the COVID-19 hotlines at the [facility] of Health ([facility]). Interpreters are available.   o For health questions: Call 172-651-9972 or 1-637.815.8472 (7 a.m. to 7 p.m.)  o For questions about schools and childcare: Call 272-156-0906 or 1-270.806.2686 (7 a.m. to 7 p.m.)                       Additional Information    Negative: Shock suspected (e.g., cold/pale/clammy skin, too weak to stand, low BP, rapid pulse)    Negative: Sounds like a  life-threatening emergency to the triager    Negative: [1] Unable to urinate (or only a few drops) > 4 hours AND     [2] bladder feels very full (e.g., palpable bladder or strong urge to urinate)    Negative: [1] Decreased urination and [2] drinking very little AND [2] dehydration suspected (e.g., dark urine, no urine > 12 hours, very dry mouth, very lightheaded)    Negative: Patient sounds very sick or weak to the triager    Negative: Fever > 100.5 F (38.1 C)    Side (flank) or lower back pain present    Protocols used: URINARY SYMPTOMS-A-AH

## 2020-07-30 ENCOUNTER — OFFICE VISIT (OUTPATIENT)
Dept: FAMILY MEDICINE | Facility: CLINIC | Age: 40
End: 2020-07-30

## 2020-07-30 VITALS
OXYGEN SATURATION: 98 % | HEIGHT: 76 IN | TEMPERATURE: 98 F | WEIGHT: 292 LBS | DIASTOLIC BLOOD PRESSURE: 84 MMHG | SYSTOLIC BLOOD PRESSURE: 136 MMHG | BODY MASS INDEX: 35.56 KG/M2 | HEART RATE: 92 BPM

## 2020-07-30 DIAGNOSIS — E78.5 HYPERLIPIDEMIA WITH TARGET LDL LESS THAN 70: ICD-10-CM

## 2020-07-30 DIAGNOSIS — R81 GLUCOSURIA: ICD-10-CM

## 2020-07-30 DIAGNOSIS — R10.9 LEFT FLANK PAIN: ICD-10-CM

## 2020-07-30 DIAGNOSIS — I10 HYPERTENSION GOAL BP (BLOOD PRESSURE) < 140/90: ICD-10-CM

## 2020-07-30 LAB — HBA1C MFR BLD: 11.4 % (ref 0–5.6)

## 2020-07-30 PROCEDURE — 99214 OFFICE O/P EST MOD 30 MIN: CPT | Performed by: NURSE PRACTITIONER

## 2020-07-30 PROCEDURE — 80048 BASIC METABOLIC PNL TOTAL CA: CPT | Performed by: NURSE PRACTITIONER

## 2020-07-30 PROCEDURE — 83036 HEMOGLOBIN GLYCOSYLATED A1C: CPT | Performed by: NURSE PRACTITIONER

## 2020-07-30 PROCEDURE — 36415 COLL VENOUS BLD VENIPUNCTURE: CPT | Performed by: NURSE PRACTITIONER

## 2020-07-30 RX ORDER — GLIPIZIDE 10 MG/1
10 TABLET, FILM COATED, EXTENDED RELEASE ORAL DAILY
Qty: 90 TABLET | Refills: 3 | Status: SHIPPED | OUTPATIENT
Start: 2020-07-30 | End: 2022-09-08

## 2020-07-30 ASSESSMENT — MIFFLIN-ST. JEOR: SCORE: 2341

## 2020-07-30 NOTE — PROGRESS NOTES
Subjective   John Dailey is a 40 year old male who presents to clinic today for the following health issues:    HPI   Diabetes Follow-up    How often are you checking your blood sugar? A few times a week /174  What time of day are you checking your blood sugars (select all that apply)?  Before meals  Have you had any blood sugars above 200?  Yes   Have you had any blood sugars below 70?  No    What symptoms do you notice when your blood sugar is low?  Dizzy    What concerns do you have today about your diabetes? None     Do you have any of these symptoms? (Select all that apply)  No numbness or tingling in feet.  No redness, sores or blisters on feet.  No complaints of excessive thirst.  No reports of blurry vision.  No significant changes to weight.    Have you had a diabetic eye exam in the last 12 months? No      2 days ago was constipated and left flank pain and pressure there. Severe previous today feels like pressure. Has had kidney stones in past. Still feeling urgency to pee but not emptying bladder feels like. Improving today. Bladder emptying improving.     BG last night 170, day before was 200, today 150. Usually testing in the morning. Not on any current medications. No neuropathy symptoms or side effects.    Doesn't want metformin, made him feel poorly.               Hyperlipidemia Follow-Up      Are you regularly taking any medication or supplement to lower your cholesterol?   No    Are you having muscle aches or other side effects that you think could be caused by your cholesterol lowering medication?  No    Hypertension Follow-up      Do you check your blood pressure regularly outside of the clinic? No     Are you following a low salt diet? No    Are your blood pressures ever more than 140 on the top number (systolic) OR more   than 90 on the bottom number (diastolic), for example 140/90? No    BP Readings from Last 2 Encounters:   07/30/20 136/84   03/07/18 (!) 140/100     Hemoglobin A1C (%)  "  Date Value   07/30/2020 11.4 (H)   02/09/2018 11.6 (H)     LDL Cholesterol Calculated (mg/dL)   Date Value   03/18/2015 55     LDL Cholesterol Direct (mg/dL)   Date Value   10/03/2017 84         How many servings of fruits and vegetables do you eat daily?  2-3    On average, how many sweetened beverages do you drink each day (Examples: soda, juice, sweet tea, etc.  Do NOT count diet or artificially sweetened beverages)?   2    How many days per week do you exercise enough to make your heart beat faster? 3 or less    How many minutes a day do you exercise enough to make your heart beat faster? 20 - 29    How many days per week do you miss taking your medication? 0      Reviewed and updated as needed this visit by provider:         Review of Systems   Constitutional, HEENT, cardiovascular, pulmonary, GI, , musculoskeletal, neuro, skin, endocrine and psych systems are negative, except as otherwise noted per HPI.      Objective   /84   Pulse 92   Temp 98  F (36.7  C) (Tympanic)   Ht 1.93 m (6' 4\")   Wt 132.5 kg (292 lb)   SpO2 98%   BMI 35.54 kg/m   Body mass index is 35.54 kg/m .  Physical Exam   GENERAL: healthy, alert, well nourished, well hydrated, no distress  HENT: ear canals- normal; TMs- normal; Nose- normal; Mouth- no ulcers, no lesions  NECK: no tenderness, no adenopathy, no asymmetry, no masses, no stiffness; thyroid- normal to palpation  RESP: lungs clear to auscultation - no rales, no rhonchi, no wheezes  CV: regular rates and rhythm, normal S1 S2, no S3 or S4 and no murmur, no click or rub -  ABDOMEN: soft, no tenderness, no  hepatosplenomegaly, no masses, normal bowel sounds  MS: extremities- no gross deformities noted, no edema  NEURO: strength and tone- normal, sensory exam- grossly normal, mentation- intact, speech- normal, reflexes- symmetric  Diabetic foot exam: normal DP and PT pulses, no trophic changes or ulcerative lesions and normal sensory exam  BACK: no CVA tenderness, no " paralumbar tenderness  PSYCH: Alert and oriented times 3; speech- coherent , normal rate and volume; able to articulate logical thoughts, able to abstract reason, no tangential thoughts, no hallucinations or delusions, affect- normal    Diagnostic Test Results  Pending      Assessment & Plan   John was seen today for diabetes.    Diagnoses and all orders for this visit:    Uncontrolled type 2 diabetes mellitus with complication, without long-term current use of insulin (H)  Discussed need for medication. Patient does not have insurance. Will trial glucotrol since can be more cost effective and won't take metformin. Labs today.   -     Hemoglobin A1c  -     Basic metabolic panel  (Ca, Cl, CO2, Creat, Gluc, K, Na, BUN)  -     Cancel: Albumin Random Urine Quantitative with Creat Ratio  -     glipiZIDE (GLUCOTROL XL) 10 MG 24 hr tablet; Take 1 tablet (10 mg) by mouth daily    Hyperlipidemia with target LDL less than 70  Not fasting. Would like to get him on statin. Plan for follow up labs.     Hypertension goal BP (blood pressure) < 140/90  In range today. Continue to monitor.     Glucosuria  Should improve with treatment. Monitor.     Left flank pain  Unknown cause. Somewhat improved today. If continuing may need further workup or imaging.            See Patient Instructions    Return in about 3 months (around 10/30/2020) for Diabetic Check-up.            MARIANA Mccracken     [address] [address]  [email or web address]   Office: [phone]

## 2020-07-31 LAB
ANION GAP SERPL CALCULATED.3IONS-SCNC: 8 MMOL/L (ref 3–14)
BUN SERPL-MCNC: 17 MG/DL (ref 7–30)
CALCIUM SERPL-MCNC: 9.8 MG/DL (ref 8.5–10.1)
CHLORIDE SERPL-SCNC: 100 MMOL/L (ref 94–109)
CO2 SERPL-SCNC: 27 MMOL/L (ref 20–32)
CREAT SERPL-MCNC: 0.9 MG/DL (ref 0.66–1.25)
GFR SERPL CREATININE-BSD FRML MDRD: >90 ML/MIN/{1.73_M2}
GLUCOSE SERPL-MCNC: 330 MG/DL (ref 70–99)
POTASSIUM SERPL-SCNC: 4.5 MMOL/L (ref 3.4–5.3)
SODIUM SERPL-SCNC: 135 MMOL/L (ref 133–144)

## 2020-08-06 NOTE — PATIENT INSTRUCTIONS
Patient Education   Diabetes and Chronic Kidney Disease (CKD)  How does diabetes affect my body?  When diabetes is not well controlled, the sugar level in your blood goes up. Many different parts of your body are affected when your blood sugar is high: kidneys, blood vessels, heart, eyes, feet and nerves. Diabetes can also cause high blood pressure, which can also damage your kidneys.  How does diabetes harm the kidneys?  Your kidneys filter waste from your body. With diabetes, these filters are damaged. This happens because diabetes injures the blood vessels in your kidneys and they can't clean your blood as well as they should. The waste will build up in your blood.  Diabetes can also injure the nerves in your body. This may make it hard for you to empty your bladder. A full bladder can put stress on the kidneys or cause an infection.  Do people with diabetes have a greater risk for getting kidney disease?  YES. Diabetes is the number one cause of chronic kidney disease (CKD). About 1 out of 3 people with diabetes may get CKD. Certain groups may have a higher risk of getting kidney disease than others. Your risk may be higher if you:    Have high blood pressure    Have poorly controlled blood sugar levels    Have a family member with CKD.  How can I slow the progression of CKD?    Know your blood sugar and Alc goals.    Control your blood sugar with the help of your diabetes care team:  ? Dietician  ? Diabetic educator  ? Nurse  ? Doctor    Keep your blood pressure under control.  ?  Know your blood pressure goals.  ? Take your medicines as directed.  ? Check your blood pressure at home as directed.  ? Do not stop taking your medicine when your blood pressure is under control. You still need to take medicine to maintain control.  ? Do NOT smoke.  ? Call your care team if you have questions.    Follow the exercisse and diet guidelines your care team gives you.  Pregnancy and diabetes  Having CKD and diabetes is  serious. If you are thinking about getting pregnant, please tell your care team.  Rembember:    Diabetes is the leading cause of CKD.    1 out of 3 people with diabetes may develop kidney failure.    Controlling diabetes can prevent CKD from getting worse.  Other resources  National Kidney Foundation   www.kidney.org  2-401-930-0276  For informational purposes only. Not to replace the advice of your health care provider.   Copyright   2016 [facility]. All rights reserved. "[employer]" 901412 - 03/16.       Patient Education   Goals for Your Diabetes Care  A1c   Goal:  Less than 7 percent--ask your doctor if this is right for you  Check it: Every 3 to 6 months  Why:  Shows how well your blood glucose was controlled over the past 3 months  Blood pressure   Goal: Under 140/90  Check it:  At every clinic check up for diabetes  Why:  May prevent stroke, heart disease and eye and kidney diseases  Cholesterol   Goal:  Discuss cholesterol management with your doctor, and consider taking a statin medicine  Check it:  Every year  Why:  Helps prevent heart attacks and stroke  Kidney test (urine microalbumin)  Goal:  Under 30  Do it:  Every year  Why:  Finds kidney problems before they get worse  Foot exam   Goal:  No cuts or sores, normal sensation  Do it: Remove shoes and socks at every visit; have a monofilament test every year  Why: Finds foot problems before they get worse  Eye exam   Goal:  No changes in your eyes  Do it: Every year  Why:  Finds eye problems before they get worse  Exercise  Goal:  150 minutes of aerobic exercises and 2 to 3 sessions of strength training  Do it: Every week  Why:  Helps manage diabetes and improve health  Aspirin  Goal:  If you are age 50 or older, ask your doctor if you should take aspirin  Take it: Every day, or as prescribed  Why: Lowers the risk of heart attack and stroke  Smoking and tobacco  Goal: No tobacco use  Why: Tobacco is a major risk for heart  disease  Diabetes education  Goal: Learn how to manage your diabetes  Do it: At least once a year--ask your doctor for a referral  Why: The more you know, the better you can manage your diabetes  Your goals may be different from what you see here. Your care team will tell you what your goals should be.   For informational purposes only. Not to replace the advice of your health care provider.   Copyright   2009 [facility]. All rights reserved. [employer] 635617 - Rev 01/16.

## 2020-10-30 ENCOUNTER — OFFICE VISIT (OUTPATIENT)
Dept: FAMILY MEDICINE | Facility: CLINIC | Age: 40
End: 2020-10-30

## 2020-10-30 VITALS
TEMPERATURE: 98.6 F | OXYGEN SATURATION: 98 % | HEIGHT: 76 IN | HEART RATE: 112 BPM | SYSTOLIC BLOOD PRESSURE: 136 MMHG | DIASTOLIC BLOOD PRESSURE: 80 MMHG | WEIGHT: 302 LBS | BODY MASS INDEX: 36.77 KG/M2

## 2020-10-30 DIAGNOSIS — K04.7 TOOTH INFECTION: Primary | ICD-10-CM

## 2020-10-30 PROCEDURE — 99213 OFFICE O/P EST LOW 20 MIN: CPT | Performed by: NURSE PRACTITIONER

## 2020-10-30 RX ORDER — CLINDAMYCIN HCL 300 MG
300 CAPSULE ORAL 4 TIMES DAILY
Qty: 40 CAPSULE | Refills: 0 | Status: SHIPPED | OUTPATIENT
Start: 2020-10-30 | End: 2020-11-09

## 2020-10-30 RX ORDER — HYDROCODONE BITARTRATE AND ACETAMINOPHEN 5; 325 MG/1; MG/1
1 TABLET ORAL EVERY 6 HOURS PRN
Qty: 18 TABLET | Refills: 0 | Status: SHIPPED | OUTPATIENT
Start: 2020-10-30 | End: 2020-11-02

## 2020-10-30 ASSESSMENT — MIFFLIN-ST. JEOR: SCORE: 2386.36

## 2020-10-30 NOTE — PROGRESS NOTES
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 Got hit in face with tree branch, limb hit him. Didn't start hurting until about 2 days ago. Feels very swollen. No fevers or chills. Pain is quite bad, 6-8/10.     Due for diabetic follow up. Medication does give him some side effect.   Has been taking regularly but blood sugars are still elevated.     Reviewed and updated as needed this visit by provider:  Tobacco  Allergies  Meds  Problems  Med Hx  Surg Hx  Fam Hx          Review of Systems   Constitutional, HEENT, cardiovascular, pulmonary, GI, , musculoskeletal, neuro, skin, endocrine and psych systems are negative, except as otherwise noted per HPI.      Objective   /80   Pulse 112   Temp 98.6  F (37  C) (Tympanic)   Ht 1.93 m (6' 4\")   Wt 137 kg (302 lb)   SpO2 98%   BMI 36.76 kg/m   Body mass index is 36.76 kg/m .  Physical Exam   GENERAL: healthy, alert, well nourished, well hydrated, no distress  HENT: ear canals- normal; TMs- normal; Nose- normal; Mouth- left upper posterior molar with surrounding infection, redness, purulent drainage. Tooth is cracked. Left inner cheek swollen, erythematous. Surrounding gum tissue swollen.  NECK: anterior tenderness, moderate anterior cervical lymphadenopathy, no asymmetry, no masses, no stiffness; thyroid- normal to palpation  RESP: lungs clear to auscultation - no rales, no rhonchi, no wheezes  CV: regular rates and rhythm, normal S1 S2, no S3 or S4 and no " "murmur, no click or rub -  MS: extremities- no gross deformities noted, no edema  SKIN: no suspicious lesions, no rashes          Assessment & Plan   John was seen today for dental pain.    Diagnoses and all orders for this visit:    Tooth infection  -     clindamycin (CLEOCIN) 300 MG capsule; Take 1 capsule (300 mg) by mouth 4 times daily for 10 days  -     HYDROcodone-acetaminophen (NORCO) 5-325 MG tablet; Take 1 tablet by mouth every 6 hours as needed for pain    Given information for low cost dental clinic. Will follow up. Plan for lab appointment for A1c.       BMI:   Estimated body mass index is 36.76 kg/m  as calculated from the following:    Height as of this encounter: 1.93 m (6' 4\").    Weight as of this encounter: 137 kg (302 lb).   Weight management plan noted, stable and monitoring        See Patient Instructions    No follow-ups on file.            MARIANA Mccracken     [address] [address]  [email or web address]   Office: [phone]           "

## 2020-11-02 ENCOUNTER — NURSE TRIAGE (OUTPATIENT)
Dept: NURSING | Facility: CLINIC | Age: 40
End: 2020-11-02

## 2020-11-03 NOTE — TELEPHONE ENCOUNTER
Triage Call:  Patient and pt's friend calling this evening. Patient gave verbal consent to talk to friend. Pt had a upper L side tooth extracted on Oct 31st. Pt started ABX on Oct 30th. Today, pt stated he noticing around 4pm that the pain was increasing. Patent has 101.0 temporal temp, chills, throbbing pain 6-7/10 on the left side from forehead to lower jaw. Pt reported having a drainage coming out of the extraction site -puss/bloody. Pt has been taking his ABX, tylenol and Norco. Patient stated on phone he does not want to continue to take his Norco. Pt was advised to go to the ED now. Patient stated hi is not going to go to the ED and that he is going to wait and call the dentist in the morning.    COVID 19 Nurse Triage Plan/Patient Instructions    Please be aware that novel coronavirus (COVID-19) may be circulating in the community. If you develop symptoms such as fever, cough, or SOB or if you have concerns about the presence of another infection including coronavirus (COVID-19), please contact your health care provider or visit [email or web address].     Disposition/Instructions    ED Visit recommended. Follow protocol based instructions.     Bring Your Own Device:  Please also bring your smart device(s) (smart phones, tablets, laptops) and their charging cables for your personal use and to communicate with your care team during your visit.    Thank you for taking steps to prevent the spread of this virus.  o Limit your contact with others.  o Wear a simple mask to cover your cough.  o Wash your hands well and often.    Resources    [facility]: About COVID-19: [email or web address]    CDC: What to Do If You're Sick: www.cdc.gov/coronavirus/2019-ncov/about/steps-when-sick.html    CDC: Ending Home Isolation: www.cdc.gov/coronavirus/2019-ncov/hcp/disposition-in-home-patients.html     CDC: Caring for Someone: www.cdc.gov/coronavirus/2019-ncov/if-you-are-sick/care-for-someone.html     [facility]: Interim  Guidance for Hospital Discharge to Home: www.health.[facility].[place]/diseases/coronavirus/hcp/hospdischarge.pdf    [facility] clinical trials (COVID-19 research studies): clinicalaffairs.[email or web address]/umn-clinical-trials     Below are the COVID-19 hotlines at the [place] Department of Health ([facility]). Interpreters are available.   o For health questions: Call 490-163-6049 or 1-225.879.7524 (7 a.m. to 7 p.m.)  o For questions about schools and childcare: Call 156-935-1474 or 1-742.827.2417 (7 a.m. to 7 p.m.)     Lisbeth Dawson RN Nursing Advisor 11/2/2020 10:28 PM       Additional Information    Negative: Sounds like a life-threatening emergency to the triager    Negative: Tooth knocked out    Negative: [1] Bleeding present > 30 minutes AND [2] using correct technique of direct pressure    Negative: [1] Bleeding now AND [2] second call after being instructed in correct technique of direct pressure    Negative: [1] Has packing sutured over socket (extraction site) AND [2] now bleeding around the packing (Exception: few drops or ooze)    Negative: Tongue is very swollen and tender    Negative: [1] Face is swollen AND [2] fever    Negative: Patient sounds very sick or weak to the triager    [1] SEVERE pain (e.g., excruciating, unable to do any normal activities) AND [2] not improved 2 hours after pain medicine    Protocols used: TOOTH [id number]

## 2020-12-20 ENCOUNTER — HEALTH MAINTENANCE LETTER (OUTPATIENT)
Age: 40
End: 2020-12-20

## 2021-01-27 ENCOUNTER — TELEPHONE (OUTPATIENT)
Dept: FAMILY MEDICINE | Facility: CLINIC | Age: 41
End: 2021-01-27

## 2021-01-27 NOTE — LETTER
[facility]  [address] [place] [address] [id number] [phone]  January 27, 2021    John Dailey  [address] [address]    Dear John,    I care about your health and have reviewed your health plan. I have reviewed your medical conditions, medication list, and lab results and am making recommendations based on this review, to better manage your health.    You are in particular need of attention regarding:  -Diabetes  -Wellness (Physical) Visit     I am recommending that you:  -schedule a WELLNESS (Physical) APPOINTMENT with me.   I will check fasting labs the same day - nothing to eat except water and meds for 8-10 hours prior.      Here is a list of Health Maintenance topics that are due now or due soon:  Health Maintenance Due   Topic Date Due     Preventive Care Visit  1980     ANNUAL REVIEW OF HM ORDERS  1981     Eye Exam  1980     Pneumococcal Vaccine (1 of 1 - PPSV23) 03/12/1987     HIV Screening  03/12/1996     Hepatitis C Screening  03/12/1999     Hepatitis B Vaccine (1 of 3 - Risk 3-dose series) 03/12/2000     Cholesterol Lab  03/18/2016     Kidney Microalbumin Urine Test  10/03/2018     Flu Vaccine (1) 09/01/2020     A1C Lab  10/30/2020     PHQ-2  01/01/2021       Please call us at [phone] (or use [employer]) to address the above recommendations.     Thank you for trusting [facility] and we appreciate the opportunity to serve you.  We look forward to supporting your healthcare needs in the future.    Healthy Regards,      Jayla Hatch, [facility]           Babar Oliveira M.D.

## 2021-01-27 NOTE — TELEPHONE ENCOUNTER
Needs of attention regarding:  -Wellness (Physical) Visit     Health Maintenance Topics with due status: Overdue       Topic Date Due    PREVENTIVE CARE VISIT 1981    ANNUAL REVIEW OF HM ORDERS 1981    EYE EXAM 1981    Pneumococcal Vaccine: Pediatrics (0 to 5 Years) and At-Risk Patients (6 to 64 Years) 03/12/1987    HIV SCREENING 03/12/1996    HEPATITIS C SCREENING 03/12/1999    HEPATITIS B IMMUNIZATION 03/12/2000    LIPID 03/18/2016    MICROALBUMIN 10/03/2018    INFLUENZA VACCINE 09/01/2020    A1C 10/30/2020    PHQ-2 01/01/2021       Communication:  See Letter

## 2021-04-24 ENCOUNTER — HEALTH MAINTENANCE LETTER (OUTPATIENT)
Age: 41
End: 2021-04-24

## 2021-08-08 ENCOUNTER — HEALTH MAINTENANCE LETTER (OUTPATIENT)
Age: 41
End: 2021-08-08

## 2021-10-03 ENCOUNTER — HEALTH MAINTENANCE LETTER (OUTPATIENT)
Age: 41
End: 2021-10-03

## 2021-11-28 ENCOUNTER — HEALTH MAINTENANCE LETTER (OUTPATIENT)
Age: 41
End: 2021-11-28

## 2022-03-20 ENCOUNTER — HEALTH MAINTENANCE LETTER (OUTPATIENT)
Age: 42
End: 2022-03-20

## 2022-05-15 ENCOUNTER — HEALTH MAINTENANCE LETTER (OUTPATIENT)
Age: 42
End: 2022-05-15

## 2022-07-10 ENCOUNTER — HEALTH MAINTENANCE LETTER (OUTPATIENT)
Age: 42
End: 2022-07-10

## 2022-09-08 ENCOUNTER — OFFICE VISIT (OUTPATIENT)
Dept: FAMILY MEDICINE | Facility: CLINIC | Age: 42
End: 2022-09-08
Payer: MEDICAID

## 2022-09-08 VITALS
HEIGHT: 76 IN | WEIGHT: 272 LBS | RESPIRATION RATE: 14 BRPM | OXYGEN SATURATION: 98 % | TEMPERATURE: 97.9 F | DIASTOLIC BLOOD PRESSURE: 100 MMHG | SYSTOLIC BLOOD PRESSURE: 164 MMHG | HEART RATE: 86 BPM | BODY MASS INDEX: 33.12 KG/M2

## 2022-09-08 DIAGNOSIS — L02.92 BOIL: Primary | ICD-10-CM

## 2022-09-08 PROCEDURE — 99214 OFFICE O/P EST MOD 30 MIN: CPT | Mod: 25 | Performed by: FAMILY MEDICINE

## 2022-09-08 PROCEDURE — 87186 SC STD MICRODIL/AGAR DIL: CPT | Performed by: FAMILY MEDICINE

## 2022-09-08 PROCEDURE — 87070 CULTURE OTHR SPECIMN AEROBIC: CPT | Performed by: FAMILY MEDICINE

## 2022-09-08 PROCEDURE — 87077 CULTURE AEROBIC IDENTIFY: CPT | Performed by: FAMILY MEDICINE

## 2022-09-08 PROCEDURE — 10060 I&D ABSCESS SIMPLE/SINGLE: CPT | Performed by: FAMILY MEDICINE

## 2022-09-08 RX ORDER — SULFAMETHOXAZOLE/TRIMETHOPRIM 800-160 MG
1 TABLET ORAL 2 TIMES DAILY
Qty: 20 TABLET | Refills: 0 | Status: SHIPPED | OUTPATIENT
Start: 2022-09-08 | End: 2022-09-18

## 2022-09-08 RX ORDER — GLIMEPIRIDE 4 MG/1
4 TABLET ORAL
Qty: 90 TABLET | Refills: 1 | Status: SHIPPED | OUTPATIENT
Start: 2022-09-08

## 2022-09-08 RX ORDER — SULFAMETHOXAZOLE/TRIMETHOPRIM 800-160 MG
1 TABLET ORAL 2 TIMES DAILY
COMMUNITY
Start: 2022-09-03 | End: 2022-09-08

## 2022-09-08 ASSESSMENT — PAIN SCALES - GENERAL: PAINLEVEL: SEVERE PAIN (7)

## 2022-09-08 NOTE — PROGRESS NOTES
"  Assessment & Plan   Boil-culture showed MRSA and sensitive to Septra  Some improvement but still significant fluctuance and no drainage from the left neck boil     - sulfamethoxazole-trimethoprim (BACTRIM DS) 800-160 MG tablet  Dispense: 20 tablet; Refill: 0  - Abscess Aerobic Bacterial Culture Routine  - DRAIN SKIN ABSCESS SIMPLE/SINGLE  After informed consent was obtained, using Hibiclens for cleansing and 1% Lidocaine with epinephrine for anesthetic, with sterile technique, incision and drainage of abscess was performed and small cavity irrigated (not packed). Antibiotic dressing is applied, and wound care instructions provided.  Be alert for any signs of cutaneous infection. The procedure was well tolerated without complications. Follow up: The patient may return prn..      Uncontrolled type 2 diabetes mellitus with complication, without long-term current use of insulin (H)   A1c is significantly elevated (11.7 in hospital) and strongly recommended starting medication in addition to improved dietary restriction and diabetic diet following.  Previously did not tolerate metformin and will start glimepiride with follow-up in about 1 month recommended.  - Adult Eye  Referral  - glimepiride (AMARYL) 4 MG tablet  Dispense: 90 tablet; Refill: 1        BMI:   Estimated body mass index is 33.11 kg/m  as calculated from the following:    Height as of this encounter: 1.93 m (6' 4\").    Weight as of this encounter: 123.4 kg (272 lb).   Weight management plan: Discussed healthy diet and exercise guidelines      Return in about 1 week (around 9/15/2022) for symptoms failing to improve or sooner if worsening.      Roverto Martinez MD      [address] [address]  [email or web address]   Office: [phone]       Jada Lopez is a 42 year old, presenting for the following health issues:  ER F/U and Hospital F/U      [facility]       Hospital Follow-up " Visit:    Hospital/Nursing Home/IP Rehab Facility: [facility]  Date of Admission: 9/1/22 - symptoms started ~ 1 week prior   Date of Discharge: 9/3/22  Reason(s) for Admission:     Sepsis due to furuncle and cellulitis of left posterior base of neck    Was your hospitalization related to COVID-19? No   Problems taking medications regularly:  None - pt states he has completed Abx but cellulitis has not gotten better and is still draining, no fevers  Medication changes since discharge:     Discharge Medications     Your Home Medicines     Not taking tthese medications    trimethoprim-sulfamethoxazole (160-800 mg) Tab  For diagnoses: Cellulitis of neck  Commonly known as: BACTRIM DS, SEPTRA DS  Take 1 Tablet by mouth two times daily for 5 days.      CONTINUE taking these medicines   Instructions   acetaminophen 500 mg tablet  Commonly known as: TYLENOL EXTRA STRGTH  Take 1,000 mg by mouth every 6 hours if needed. Max acetaminophen dose: 4000mg in 24 hrs.    ibuprofen 200 mg tablet  Commonly known as: ADVIL; MOTRIN  Take 800 mg by mouth 4 times daily if needed.      trimethoprim-sulfamethoxazole (160-800 mg) Tab    Problems adhering to non-medication therapy:  None    Summary of hospitalization:  CareEverywhere information obtained and reviewed  Diagnostic Tests/Treatments reviewed.  Follow up needed: none  Other Healthcare Providers Involved in Patient s Care:         None  Update since discharge: ongoing drainage since discharge         Post Medication Reconciliation Status: Discharge medications reconciled, continue medications without change      Plan of care communicated with patient           Diabetes Follow-up    How often are you checking your blood sugar? A few times a month    Have you had any blood sugars above 200?  Yes   Have you had any blood sugars below 70?  No    What symptoms do you notice when your blood sugar is low?  None    What concerns do you have today about your diabetes? Getting  "infections often and Blood sugar is often over 200     Do you have any of these symptoms? (Select all that apply)  No numbness or tingling in feet.  No redness, sores or blisters on feet.  No complaints of excessive thirst.  No reports of blurry vision.  No significant changes to weight.    Have you had a diabetic eye exam in the last 12 months? No    Metformin  Use - did not like how he felt but unsure as it was  10 years ago.    Lab Results   Component Value Date    A1C 11.4 07/30/2020    A1C 11.6 02/09/2018    A1C 11.5 10/03/2017    A1C 11.5 03/18/2015      Hemoglobin A1c   Average Blood Sugar    6%    135 mg/dL  7%    170 mg/dL  8%    205 mg/dL   9%    240 mg/dL   10%    275 mg/dL         BP Readings from Last 2 Encounters:   09/08/22 (!) 164/100   10/30/20 136/80     Hemoglobin A1C (%)   Date Value   07/30/2020 11.4 (H)   02/09/2018 11.6 (H)     LDL Cholesterol Calculated (mg/dL)   Date Value   03/18/2015 55     LDL Cholesterol Direct (mg/dL)   Date Value   10/03/2017 84                 Review of Systems         Objective    BP (!) 164/100   Pulse 86   Temp 97.9  F (36.6  C) (Tympanic)   Resp 14   Ht 1.93 m (6' 4\")   Wt 123.4 kg (272 lb)   SpO2 98%   BMI 33.11 kg/m    Body mass index is 33.11 kg/m .  Physical Exam   GENERAL: healthy, alert and no distress  HENT: ear canals and TM's normal, nose and mouth without ulcers or lesions  NECK: no adenopathy, no asymmetry, masses, or scars and thyroid normal to palpation  RESP: lungs clear to auscultation - no rales, rhonchi or wheezes  CV: regular rate and rhythm, normal S1 S2, no S3 or S4, no murmur, click or rub, no peripheral edema and peripheral pulses strong  ABDOMEN: soft, nontender, no hepatosplenomegaly, no masses and bowel sounds normal  MS: no gross musculoskeletal defects noted, no edema  SKIN: no suspicious lesions or rashes  NEURO: Normal strength and tone, mentation intact and speech normal  PSYCH: mentation appears normal, affect " normal/bright  Diabetic foot exam: normal DP and PT pulses, no trophic changes or ulcerative lesions and normal sensory exam

## 2022-09-08 NOTE — LETTER
[facility]  [address] [place] [address]  [phone]                    September 16, 2022    John Dailey  [address] [facility] [address]      Dear John,    Here is a summary of your recent test results:    The culture grew staph bacteria that is sensitive to the antibiotic you are on (but is resistant to methicillin and therefore is methicillin resistant staph aureus (MRSA))- make sure to finish the antibiotic course and work on improving your diabetes blood glucose control.  You should be seen with int he next month to review your diabetes situation..     For additional lab test information, www.testing.com is a very good reference.       Your test results are enclosed.      Please contact me if you have any questions.    In addition, here is a list of due or overdue Health Maintenance reminders.    Health Maintenance Due   Topic Date Due     Preventive Care Visit  Never done     Eye Exam  Never done     COVID-19 Vaccine (1) Never done     Pneumococcal Vaccine (1 - PCV) Never done     HIV Screening  Never done     Hepatitis C Screening  Never done     Hepatitis B Vaccine (1 of 3 - Risk 3-dose series) Never done     Cholesterol Lab  03/18/2016     Comprehensive Metabolic Panel  10/03/2018     Kidney Microalbumin Urine Test  10/03/2018     Basic Metabolic Panel  07/30/2021     Flu Vaccine (1) 09/01/2022       Please call us at [phone] (or use [employer]) to address the above recommendations.            Thank you very much for trusting [facility].     Healthy regards,            Roverto Martinez M.D.        Results for orders placed or performed in visit on 09/08/22   Abscess Aerobic Bacterial Culture Routine     Status: Abnormal    Specimen: Neck, Left; Abscess   Result Value Ref Range    Culture 1+ Staphylococcus aureus MRSA (A)        Susceptibility    Staphylococcus aureus MRSA - NISH*     Oxacillin* >=4 Resistant ug/mL      *  Oxacillin susceptible isolates are susceptible to cephalosporins (example: cefazolin and cephalexin) and beta lactam combination agents. Oxacillin resistant isolates are resistant to these agents.     Gentamicin <=0.5 Susceptible ug/mL     Erythromycin >=8 Resistant ug/mL     Clindamycin* <=0.25 Susceptible ug/mL      * This isolate DOES NOT demonstrate inducible clindamycin resistance in vitro. Clindamycin is susceptible and could be used when indicated, however, erythromycin is resistant and should not be used.     Linezolid 2 Susceptible ug/mL     Vancomycin 1 Susceptible ug/mL     Tetracycline <=1 Susceptible ug/mL     Trimethoprim/Sulfamethoxazole <=0.5/9.5 Susceptible ug/mL     * MRSA requires contact precautions.

## 2022-09-10 LAB — BACTERIA ABSC ANAEROBE+AEROBE CULT: ABNORMAL

## 2022-09-12 NOTE — RESULT ENCOUNTER NOTE
Note to Staff: please call the patient to explain results. and call the patient to check on current symptoms.    The culture grew staph bacteria that is sensitive to the antibiotic you are on (but is resistant to methicillin and therefore is methicillin resistant staph aureus (MRSA))- make sure to finish the antibiotic course and work on improving your diabetes blood glucose control.  You should be seen with int he next month to review your diabetes situation..     For additional lab test information, www.testing.com is a very good reference.

## 2022-09-20 ENCOUNTER — NURSE TRIAGE (OUTPATIENT)
Dept: NURSING | Facility: CLINIC | Age: 42
End: 2022-09-20

## 2022-09-20 NOTE — TELEPHONE ENCOUNTER
RN called to follow up on second level triage.    Clinic will check on the progress.  Jacinda Yeager RN on 9/20/2022 at 5:35 PM

## 2022-09-20 NOTE — TELEPHONE ENCOUNTER
Patient called back.     Scheduled nurse visit tomorrow     Next 5 appointments (look out 90 days)    Sep 21, 2022  2:00 PM  Nurse Only with RV RN VISITS  [facility] ([facility] ) [address] [place] [id number] [phone]   Sep 29, 2022 12:00 PM  (Arrive by 11:40 AM)  Provider Visit with Babar Oliveira MD  [facility] ([facility] ) [address] [place] [id number] [phone]         Liana Chau RN  [facility]

## 2022-09-20 NOTE — TELEPHONE ENCOUNTER
Attempt # 1    Called # [phone]     Left a non detailed VM to call back at [phone] and ask for any available Triage Nurse.    If patient calls back, can schedule nurse visit tomorrow morning for wound check, and when Dr. Oliveira is in clinic.     Liana Chau RN  [facility]

## 2022-09-20 NOTE — TELEPHONE ENCOUNTER
Nurse Triage SBAR    Is this a 2nd Level Triage? YES, LICENSED PRACTITIONER REVIEW IS REQUIRED    Situation: Patient calling about boil on left side of neck. Patient finished antibiotic yesterday and feels symptoms of infection have returned. Patient is asking if he should get another antibiotic.  Consent: not needed    Background: Patient was seen in clinic on 9/8/22 for boil on his neck. Was prescribed Bactrim DS that he finished yesterday.    Assessment:   Still draining - clear, bloody fluid  Using hot packs 3-4 times per day  Swollen, hard lump at incision site   3x3 area is red and warm  No fever    Protocol Recommended Disposition:   Callback by PCP today    Recommendation: Advised patient to wait for call back after speaking to provider.  Patient verbalized understanding and agreed with plan.     Routed to provider to advise. Patient uses [facility] on [address] In [place].    Does the patient meet one of the following criteria for ADS visit consideration? 16+ years old, with an [facility] PCP     TIP  Providers, please consider if this condition is appropriate for management at one of our Acute and Diagnostic Services sites.     If patient is a good candidate, please use dotphrase <dot>triageresponse and select Refer to ADS to document.     Taya Wu RN [facility] 9/20/2022 2:33 PM    Reason for Disposition    Taking antibiotic > 72 hours (3 days) and symptoms (other than fever) not improved    Additional Information    Negative: SEVERE difficulty breathing (e.g., struggling for each breath, speaks in single words)    Negative: Sounds like a life-threatening emergency to the triager    Negative: Sinus infection and taking an antibiotic    Negative: Wound infection and taking an antibiotic    Negative: MODERATE difficulty breathing (e.g., speaks in phrases, SOB even at rest, pulse 100-120)    Negative: Fever > 103 F  (39.4 C)    Negative: Patient sounds very sick or weak to the  triager    Negative: Finished taking antibiotics and symptoms are BETTER but are not completely gone    Negative: Patient wants to be seen    Negative: Taking antibiotic and new-onset of fever    Negative: Taking antibiotic > 48 hours (2 days) and fever still present (SAME)    Protocols used: INFECTION ON ANTIBIOTIC FOLLOW-UP CALL-A-OH

## 2022-09-21 ENCOUNTER — ALLIED HEALTH/NURSE VISIT (OUTPATIENT)
Dept: NURSING | Facility: CLINIC | Age: 42
End: 2022-09-21
Payer: MEDICAID

## 2022-09-21 DIAGNOSIS — L02.92 BOIL: Primary | ICD-10-CM

## 2022-09-21 PROCEDURE — 99207 PR NO CHARGE NURSE ONLY: CPT

## 2022-09-21 RX ORDER — SULFAMETHOXAZOLE/TRIMETHOPRIM 800-160 MG
1 TABLET ORAL 2 TIMES DAILY
Qty: 20 TABLET | Refills: 0 | Status: SHIPPED | OUTPATIENT
Start: 2022-09-21 | End: 2022-09-29

## 2022-09-21 NOTE — PROGRESS NOTES
"    Patient is here for a wound check     9/8/22 office visit for boil -left neck -lanced-+ MRSA    bactrim prescribed 9/8/22 for 10 days-completed      Scab on left neck is smaller than a pea    No redness today , no warmth , no fever, patient overall feels well      Patient stated \"area was really red yesterday\"       Wound Base Color: appears like other skin/normal besides small scab. Not red or pink    Surrounding Tissue: Intact, not red or pink    About 4 inch area around boil feels hard-doesn't appear to be raised-the patient states the area surrounding the boil has increased and become harder the last couple days .    Exudate: Small amount of clear and bloody drainage at night on pillow case-dime to quarter size since it was lanced.     Odor: No    Pain:  mild tenderness,skin around boil feels tight when he tilts neck     Dressing Change:VIKASH     Dr. Oliveira viewed patient's wound-advised another 10 days of bactrim-ordered    Dr. Oliveira stated to follow up with Dr. Martinez in 10 days patient already had an appointment  with dr. Oliveira in 8 days.     Advised to Avoid touching area  Wash hands with soap and water after touching  Monitor for fever c  Cleanse area daily with antibacterial soap  continue to warm pack 2-3 times a day until scab/area is healed   Apply bacitracin to area 2 times daily.  Advised of signs and symptoms of infection   Advised to call for persistent diarrhea  Advised to take a probiotic at least 2 hours apart from antibitoic  Advised patient to call clinic triage line for new, persistent or worsening symptoms.     Yvonne DE LOS SANTOS RN   [facility] Triage         "

## 2022-09-29 ENCOUNTER — OFFICE VISIT (OUTPATIENT)
Dept: FAMILY MEDICINE | Facility: CLINIC | Age: 42
End: 2022-09-29
Payer: MEDICAID

## 2022-09-29 VITALS
SYSTOLIC BLOOD PRESSURE: 162 MMHG | RESPIRATION RATE: 16 BRPM | HEIGHT: 76 IN | TEMPERATURE: 97.6 F | OXYGEN SATURATION: 97 % | HEART RATE: 94 BPM | BODY MASS INDEX: 34 KG/M2 | DIASTOLIC BLOOD PRESSURE: 100 MMHG | WEIGHT: 279.2 LBS

## 2022-09-29 DIAGNOSIS — I10 HYPERTENSION GOAL BP (BLOOD PRESSURE) < 130/80: ICD-10-CM

## 2022-09-29 DIAGNOSIS — F43.25 ADJUSTMENT DISORDER WITH MIXED DISTURBANCE OF EMOTIONS AND CONDUCT: ICD-10-CM

## 2022-09-29 DIAGNOSIS — S06.9X9D TRAUMATIC BRAIN INJURY WITH LOSS OF CONSCIOUSNESS, SUBSEQUENT ENCOUNTER: ICD-10-CM

## 2022-09-29 DIAGNOSIS — G47.33 OSA (OBSTRUCTIVE SLEEP APNEA): ICD-10-CM

## 2022-09-29 DIAGNOSIS — N20.0 NEPHROLITHIASIS: ICD-10-CM

## 2022-09-29 DIAGNOSIS — Z11.59 NEED FOR HEPATITIS C SCREENING TEST: ICD-10-CM

## 2022-09-29 DIAGNOSIS — E78.5 HYPERLIPIDEMIA WITH TARGET LDL LESS THAN 70: ICD-10-CM

## 2022-09-29 DIAGNOSIS — Z11.4 SCREENING FOR HIV (HUMAN IMMUNODEFICIENCY VIRUS): ICD-10-CM

## 2022-09-29 DIAGNOSIS — Z51.81 MEDICATION MONITORING ENCOUNTER: ICD-10-CM

## 2022-09-29 DIAGNOSIS — Z12.5 SCREENING FOR PROSTATE CANCER: ICD-10-CM

## 2022-09-29 DIAGNOSIS — Z23 NEED FOR TDAP VACCINATION: ICD-10-CM

## 2022-09-29 PROCEDURE — 90471 IMMUNIZATION ADMIN: CPT | Performed by: FAMILY MEDICINE

## 2022-09-29 PROCEDURE — 99214 OFFICE O/P EST MOD 30 MIN: CPT | Mod: 25 | Performed by: FAMILY MEDICINE

## 2022-09-29 PROCEDURE — 90715 TDAP VACCINE 7 YRS/> IM: CPT | Performed by: FAMILY MEDICINE

## 2022-09-29 RX ORDER — GABAPENTIN 300 MG/1
CAPSULE ORAL
COMMUNITY
Start: 2022-09-03 | End: 2022-09-29

## 2022-09-29 NOTE — PROGRESS NOTES
Assessment & Plan       ICD-10-CM    1. Uncontrolled type 2 diabetes mellitus with complication, without long-term current use of insulin (H)  E11.8 Adult Eye  Referral    E11.65 FOOT EXAM     Comprehensive metabolic panel     Lipid panel reflex to direct LDL Fasting     CBC with platelets     CK total     UA reflex to Microscopic and Culture     Albumin Random Urine Quantitative with Creat Ratio     TSH with free T4 reflex     PSA, screen     blood glucose monitoring (NO BRAND SPECIFIED) meter device kit     blood glucose (NO BRAND SPECIFIED) test strip   2. Hypertension goal BP (blood pressure) < 130/80  I10 Comprehensive metabolic panel     UA reflex to Microscopic and Culture     Albumin Random Urine Quantitative with Creat Ratio     TSH with free T4 reflex   3. Hyperlipidemia with target LDL less than 70  E78.5 Comprehensive metabolic panel     Lipid panel reflex to direct LDL Fasting     CK total   4. YULIA (obstructive sleep apnea)  G47.33 TSH with free T4 reflex   5. Traumatic brain injury with loss of consciousness, subsequent encounter  S06.9X9D    6. Nephrolithiasis  N20.0 UA reflex to Microscopic and Culture     Albumin Random Urine Quantitative with Creat Ratio   7. Adjustment disorder with mixed disturbance of emotions and conduct  F43.25 TSH with free T4 reflex   8. Medication monitoring encounter  Z51.81 Comprehensive metabolic panel     Lipid panel reflex to direct LDL Fasting     CBC with platelets     CK total     UA reflex to Microscopic and Culture     Albumin Random Urine Quantitative with Creat Ratio     TSH with free T4 reflex   9. Screening for HIV (human immunodeficiency virus)  Z11.4 HIV Antigen Antibody Combo   10. Need for hepatitis C screening test  Z11.59 Hepatitis C Screen Reflex to HCV RNA Quant and Genotype   11. Screening for prostate cancer  Z12.5 PSA, screen   12. Need for Tdap vaccination  Z23 TDAP VACCINE (Adacel, Boostrix)  [[id number]]     Discussed treatment/modality  options, including risk and benefits, he desires:    1) glucose meter    2) fasting labs    3) desires tdap - multiple cuts    4) declines COVID, flu    5) declines CDE    6) insurance stops 10/1/2022    7) will probably need meds for DM/Htn/Lipids    8) wound cares reviewed, looks good    All diagnosis above reviewed and noted above, otherwise stable.      See [facility] orders for further details.      Return in 1 week (on 10/6/2022) for Medication Recheck Visit, Follow Up Chronic, Lab Work.    No LOS data to display    Doing chart review, history and exam, documentation and further activities as noted.           Babar Oliveira MD, FAAFP     [facility]  [address] [place] [id number]  [email or web address]   Office: [phone]  Fax: [phone]  Pager: [phone]       Jada Lopez is a 42 year old, presenting for the following health issues:    Diabetes    History of Present Illness       Reason for visit:  Follow up    He eats 2-3 servings of fruits and vegetables daily.He consumes 1 sweetened beverage(s) daily.He exercises with enough effort to increase his heart rate 60 or more minutes per day.  He exercises with enough effort to increase his heart rate 7 days per week.   He is taking medications regularly.     Diabetes Follow-up    How often are you checking your blood sugar? Two times daily  Blood sugar testing frequency justification:  Uncontrolled diabetes  What time of day are you checking your blood sugars (select all that apply)?  Before and after meals  Have you had any blood sugars above 200?  No  Have you had any blood sugars below 70?  No    What symptoms do you notice when your blood sugar is low?  None    What concerns do you have today about your diabetes? None     Do you have any of these symptoms? (Select all that apply)  No numbness or tingling in feet.  No redness, sores or blisters on feet.   No complaints of excessive thirst.  No reports of blurry vision.  No significant changes to weight.    Have you had a diabetic eye exam in the last 12 months? No    Range 130 to 190  Average 150    Lab Results   Component Value Date    A1C 11.4 07/30/2020    A1C 11.6 02/09/2018    A1C 11.5 10/03/2017    A1C 11.5 03/18/2015             Hyperlipidemia Follow-Up      Are you regularly taking any medication or supplement to lower your cholesterol?   No    Are you having muscle aches or other side effects that you think could be caused by your cholesterol lowering medication?  No     Recent Labs   Lab Test 10/03/17  1330 03/18/15  0956   CHOL  --  134   HDL  --  30*   LDL 84 55   TRIG  --  244*   CHOLHDLRATIO  --  4.5     Hypertension Follow-up      Do you check your blood pressure regularly outside of the clinic? No     Are you following a low salt diet? Yes    Are your blood pressures ever more than 140 on the top number (systolic) OR more   than 90 on the bottom number (diastolic), for example 140/90? No    BP Readings from Last 3 Encounters:   09/29/22 (!) 162/100   09/08/22 (!) 164/100   10/30/20 136/80     Creatinine   Date Value Ref Range Status   07/30/2020 0.90 0.66 - 1.25 mg/dL Final     GFR Estimate   Date Value Ref Range Status   07/30/2020 >90 >60 mL/min/[1.73_m2] Final     Comment:     Non  GFR Calc  Starting 12/18/2018, serum creatinine based estimated GFR (eGFR) will be   calculated using the Chronic Kidney Disease Epidemiology Collaboration   (CKD-EPI) equation.       Concern - follow up check wound back of head - treated with I&D and bactrim by RL, recent hospitalization - no f/c/s - no cellulitis    Continued weight loss, 580 lbs max to 279 lbs    Wt Readings from Last 4 Encounters:   09/29/22 126.6 kg (279 lb 3.2 oz)   09/08/22 123.4 kg (272 lb)   10/30/20 137 kg (302 lb)   07/30/20 132.5 kg (292 lb)     Review of Systems   CONSTITUTIONAL: NEGATIVE for fever, chills, change in  "weight  INTEGUMENTARY/SKIN: NEGATIVE for worrisome rashes, moles or lesions  EYES: NEGATIVE for vision changes or irritation  ENT/MOUTH: NEGATIVE for ear, mouth and throat problems  RESP: NEGATIVE for significant cough or SOB  CV: NEGATIVE for chest pain, palpitations or peripheral edema  GI: NEGATIVE for nausea, abdominal pain, heartburn, or change in bowel habits  : NEGATIVE for frequency, dysuria, or hematuria  MUSCULOSKELETAL: NEGATIVE for significant arthralgias or myalgia  NEURO: NEGATIVE for weakness, dizziness or paresthesias  ENDOCRINE: NEGATIVE for temperature intolerance, skin/hair changes  HEME: NEGATIVE for bleeding problems  PSYCHIATRIC: NEGATIVE for changes in mood or affect      Objective    BP (!) 162/100   Pulse 94   Temp 97.6  F (36.4  C) (Tympanic)   Resp 16   Ht 1.93 m (6' 4\")   Wt 126.6 kg (279 lb 3.2 oz)   SpO2 97%   BMI 33.99 kg/m    Body mass index is 33.99 kg/m .     Physical Exam   GENERAL: healthy, alert and no distress  EYES: Eyes grossly normal to inspection, PERRL and conjunctivae and sclerae normal  HENT: ear canals and TM's normal, nose and mouth without ulcers or lesions  NECK: no adenopathy, no asymmetry, masses, or scars and thyroid normal to palpation  RESP: lungs clear to auscultation - no rales, rhonchi or wheezes  CV: regular rate and rhythm, normal S1 S2, no S3 or S4, no murmur, click or rub, no peripheral edema and peripheral pulses strong  ABDOMEN: soft, nontender, no hepatosplenomegaly, no masses and bowel sounds normal  MS: no gross musculoskeletal defects noted, no edema  SKIN: no suspicious lesions or rashes  NEURO: Normal strength and tone, mentation intact and speech normal  PSYCH: mentation appears normal, affect normal/bright    Labs pending        "

## 2022-10-04 PROBLEM — E11.8 TYPE 2 DIABETES MELLITUS WITH UNSPECIFIED COMPLICATIONS (H): Status: ACTIVE | Noted: 2017-10-03

## 2023-01-23 ENCOUNTER — TELEPHONE (OUTPATIENT)
Dept: FAMILY MEDICINE | Facility: CLINIC | Age: 43
End: 2023-01-23
Payer: MEDICAID

## 2023-01-23 NOTE — TELEPHONE ENCOUNTER
Needs of attention regarding:  -Diabetes  -Wellness (Physical) Visit     Health Maintenance Topics with due status: Overdue       Topic Date Due    YEARLY PREVENTIVE VISIT Never done    EYE EXAM Never done    HEPATITIS B IMMUNIZATION Never done    COVID-19 Vaccine Never done    Pneumococcal Vaccine: Pediatrics (0 to 5 Years) and At-Risk Patients (6 to 64 Years) Never done    HIV SCREENING Never done    HEPATITIS C SCREENING Never done    LIPID 03/18/2016    CMP 10/03/2018    MICROALBUMIN 10/03/2018    BMP 07/30/2021    INFLUENZA VACCINE 09/01/2022    A1C 12/01/2022    PHQ-2 (once per calendar year) 01/01/2023       Communication:  See Letter

## 2023-01-23 NOTE — LETTER
[facility]           [address] [place] [address]  [phone]  January 23, 2023    John Dailey  [address] [address]    Dear John,    This is a reminder that you are due for a Wellness Visit (annual full physical).   So that you get your desired appointment time, please call [phone] to schedule this or you can use [employer] if you have an account. If you have had this visit completed elsewhere, or you believe you received this letter in error, please contact us at [phone].    In addition, here is a list of due or overdue Health Maintenance reminders.    Health Maintenance Due   Topic Date Due     Yearly Preventive Visit  Never done     Eye Exam  Never done     Hepatitis B Vaccine (1 of 3 - 3-dose series) Never done     COVID-19 Vaccine (1) Never done     Pneumococcal Vaccine (1 - PCV) Never done     HIV Screening  Never done     Hepatitis C Screening  Never done     Cholesterol Lab  03/18/2016     Comprehensive Metabolic Panel  10/03/2018     Kidney Microalbumin Urine Test  10/03/2018     Basic Metabolic Panel  07/30/2021     Flu Vaccine (1) 09/01/2022     A1C Lab  12/01/2022     PHQ-2 (once per calendar year)  01/01/2023     Best Regards,        Babar Oliveira M.D.

## 2023-06-28 ENCOUNTER — TELEPHONE (OUTPATIENT)
Dept: FAMILY MEDICINE | Facility: CLINIC | Age: 43
End: 2023-06-28
Payer: MEDICAID

## 2023-06-28 NOTE — TELEPHONE ENCOUNTER
Needs of attention regarding:  -Wellness (Physical) Visit     Health Maintenance Topics with due status: Overdue       Topic Date Due    YEARLY PREVENTIVE VISIT Never done    EYE EXAM Never done    HEPATITIS B IMMUNIZATION Never done    COVID-19 Vaccine Never done    Pneumococcal Vaccine: Pediatrics (0 to 5 Years) and At-Risk Patients (6 to 64 Years) Never done    HIV SCREENING Never done    HEPATITIS C SCREENING Never done    LIPID 03/18/2016    CMP 10/03/2018    MICROALBUMIN 10/03/2018    BMP 07/30/2021    A1C 12/01/2022    PHQ-2 (once per calendar year) 01/01/2023       Communication:  See Letter

## 2023-06-28 NOTE — LETTER
[facility]           [address] [place] [address]  [phone]  June 28, 2023    John Dailey  [address] [address]    Dear John,     This is a reminder that you are due for a Wellness Visit (annual full physical).   So that you get your desired appointment time, please call [phone] to schedule this or you can use [employer] if you have an account. If you have had this visit completed elsewhere, or you believe you received this letter in error, please contact us at [phone].    In addition, here is a list of due or overdue Health Maintenance reminders.    Health Maintenance Due   Topic Date Due    Yearly Preventive Visit  Never done    Eye Exam  Never done    Hepatitis B Vaccine (1 of 3 - 3-dose series) Never done    COVID-19 Vaccine (1) Never done    Pneumococcal Vaccine (1 - PCV) Never done    HIV Screening  Never done    Hepatitis C Screening  Never done    Cholesterol Lab  03/18/2016    Comprehensive Metabolic Panel  10/03/2018    Kidney Microalbumin Urine Test  10/03/2018    Basic Metabolic Panel  07/30/2021    A1C Lab  12/01/2022    PHQ-2 (once per calendar year)  01/01/2023     Babar Jacob M.D.